# Patient Record
Sex: FEMALE | Race: WHITE | Employment: OTHER | URBAN - METROPOLITAN AREA
[De-identification: names, ages, dates, MRNs, and addresses within clinical notes are randomized per-mention and may not be internally consistent; named-entity substitution may affect disease eponyms.]

---

## 2017-03-03 ENCOUNTER — HOSPITAL ENCOUNTER (OUTPATIENT)
Dept: MRI IMAGING | Age: 67
Discharge: HOME | End: 2017-03-03
Attending: ORTHOPAEDIC SURGERY
Payer: MEDICARE

## 2017-03-03 DIAGNOSIS — M54.16 LUMBAR RADICULOPATHY: ICD-10-CM

## 2017-03-03 PROCEDURE — 72148 MRI LUMBAR SPINE W/O DYE: CPT

## 2017-03-07 PROBLEM — M48.061 SPINAL STENOSIS OF LUMBAR REGION: Status: ACTIVE | Noted: 2017-03-07

## 2017-03-07 PROBLEM — M51.36 DDD (DEGENERATIVE DISC DISEASE), LUMBAR: Status: ACTIVE | Noted: 2017-03-07

## 2017-03-07 PROBLEM — M51.369 DDD (DEGENERATIVE DISC DISEASE), LUMBAR: Status: ACTIVE | Noted: 2017-03-07

## 2017-03-16 PROBLEM — M75.82 ROTATOR CUFF TENDONITIS, LEFT: Status: ACTIVE | Noted: 2017-03-16

## 2017-03-16 PROBLEM — M70.62 TROCHANTERIC BURSITIS OF LEFT HIP: Status: ACTIVE | Noted: 2017-03-16

## 2017-03-23 PROBLEM — M54.16 LUMBAR RADICULITIS: Status: ACTIVE | Noted: 2017-03-23

## 2017-04-28 ENCOUNTER — HOSPITAL ENCOUNTER (OUTPATIENT)
Dept: MAMMOGRAPHY | Age: 67
Discharge: HOME | End: 2017-04-28
Attending: INTERNAL MEDICINE
Payer: MEDICARE

## 2017-04-28 DIAGNOSIS — Z12.39 SCREENING FOR BREAST CANCER: ICD-10-CM

## 2017-04-28 DIAGNOSIS — Z12.31 ENCOUNTER FOR SCREENING MAMMOGRAM FOR MALIGNANT NEOPLASM OF BREAST: ICD-10-CM

## 2017-04-28 PROCEDURE — 77067 SCR MAMMO BI INCL CAD: CPT

## 2017-07-24 PROBLEM — M70.61 TROCHANTERIC BURSITIS, RIGHT HIP: Status: ACTIVE | Noted: 2017-07-24

## 2017-07-24 PROBLEM — S32.592A PUBIC RAMUS FRACTURE, LEFT, CLOSED, INITIAL ENCOUNTER (HCC): Status: ACTIVE | Noted: 2017-07-24

## 2017-11-06 PROCEDURE — 86803 HEPATITIS C AB TEST: CPT | Performed by: INTERNAL MEDICINE

## 2017-11-06 PROCEDURE — 36415 COLL VENOUS BLD VENIPUNCTURE: CPT | Performed by: INTERNAL MEDICINE

## 2018-05-03 ENCOUNTER — HOSPITAL ENCOUNTER (OUTPATIENT)
Dept: MAMMOGRAPHY | Age: 68
Discharge: HOME | End: 2018-05-03
Attending: INTERNAL MEDICINE
Payer: MEDICARE

## 2018-05-03 DIAGNOSIS — Z12.31 ENCOUNTER FOR SCREENING MAMMOGRAM FOR MALIGNANT NEOPLASM OF BREAST: ICD-10-CM

## 2018-05-03 PROCEDURE — 77063 BREAST TOMOSYNTHESIS BI: CPT | Performed by: INTERNAL MEDICINE

## 2018-05-03 PROCEDURE — 77067 SCR MAMMO BI INCL CAD: CPT | Performed by: INTERNAL MEDICINE

## 2018-12-30 PROBLEM — M17.11 PRIMARY OSTEOARTHRITIS OF RIGHT KNEE: Status: ACTIVE | Noted: 2018-12-30

## 2019-03-27 PROBLEM — Z86.010 PERSONAL HISTORY OF COLONIC POLYPS: Status: ACTIVE | Noted: 2019-03-27

## 2019-03-27 PROBLEM — K64.8 OTHER HEMORRHOIDS: Status: ACTIVE | Noted: 2019-03-27

## 2019-03-27 PROBLEM — D12.3 BENIGN NEOPLASM OF TRANSVERSE COLON: Status: ACTIVE | Noted: 2019-03-27

## 2019-03-27 PROBLEM — Z80.0 FAMILY HISTORY OF MALIGNANT NEOPLASM OF DIGESTIVE ORGAN: Status: ACTIVE | Noted: 2019-03-27

## 2019-03-27 PROBLEM — Z86.0100 PERSONAL HISTORY OF COLONIC POLYPS: Status: ACTIVE | Noted: 2019-03-27

## 2019-03-27 PROBLEM — K63.5 POLYP OF COLON: Status: ACTIVE | Noted: 2019-03-27

## 2019-04-04 PROCEDURE — 81003 URINALYSIS AUTO W/O SCOPE: CPT | Performed by: INTERNAL MEDICINE

## 2019-05-22 ENCOUNTER — HOSPITAL ENCOUNTER (OUTPATIENT)
Dept: MAMMOGRAPHY | Age: 69
Discharge: HOME | End: 2019-05-22
Attending: INTERNAL MEDICINE
Payer: MEDICARE

## 2019-05-22 DIAGNOSIS — Z12.31 ENCOUNTER FOR SCREENING MAMMOGRAM FOR MALIGNANT NEOPLASM OF BREAST: ICD-10-CM

## 2019-05-22 PROCEDURE — 77063 BREAST TOMOSYNTHESIS BI: CPT | Performed by: INTERNAL MEDICINE

## 2019-05-22 PROCEDURE — 77067 SCR MAMMO BI INCL CAD: CPT | Performed by: INTERNAL MEDICINE

## 2019-10-14 ENCOUNTER — HOSPITAL ENCOUNTER (OUTPATIENT)
Dept: PHYSICAL THERAPY | Facility: HOSPITAL | Age: 69
Discharge: HOME | End: 2019-10-14
Attending: ORTHOPAEDIC SURGERY
Payer: MEDICARE

## 2019-10-14 ENCOUNTER — APPOINTMENT (OUTPATIENT)
Dept: LAB | Facility: HOSPITAL | Age: 69
End: 2019-10-14
Attending: ORTHOPAEDIC SURGERY
Payer: MEDICARE

## 2019-10-14 DIAGNOSIS — M17.11 PRIMARY OSTEOARTHRITIS OF RIGHT KNEE: ICD-10-CM

## 2019-10-14 DIAGNOSIS — I10 ESSENTIAL HYPERTENSION, BENIGN: ICD-10-CM

## 2019-10-14 DIAGNOSIS — Z79.899 HIGH RISK MEDICATION USE: ICD-10-CM

## 2019-10-14 PROCEDURE — 86850 RBC ANTIBODY SCREEN: CPT

## 2019-10-14 PROCEDURE — 36415 COLL VENOUS BLD VENIPUNCTURE: CPT

## 2019-10-14 PROCEDURE — 80053 COMPREHEN METABOLIC PANEL: CPT

## 2019-10-14 PROCEDURE — 86900 BLOOD TYPING SEROLOGIC ABO: CPT

## 2019-10-14 PROCEDURE — 84443 ASSAY THYROID STIM HORMONE: CPT

## 2019-10-14 PROCEDURE — 85610 PROTHROMBIN TIME: CPT

## 2019-10-14 PROCEDURE — 86901 BLOOD TYPING SEROLOGIC RH(D): CPT

## 2019-10-14 PROCEDURE — 85025 COMPLETE CBC W/AUTO DIFF WBC: CPT

## 2019-10-14 PROCEDURE — 85730 THROMBOPLASTIN TIME PARTIAL: CPT

## 2019-10-17 ENCOUNTER — ANESTHESIA EVENT (OUTPATIENT)
Dept: SURGERY | Facility: HOSPITAL | Age: 69
DRG: 470 | End: 2019-10-17
Payer: MEDICARE

## 2019-10-22 NOTE — H&P
[address] Patient Status:  Surgery Admit - Inpt    1950 MRN [id number]   [facility] SURGERY Attending Anuradha ySlvester MD   Hosp Day # 0 PCP Jerrod Sterling MD     Date of Admission:  (Not on fi N/A 3/9/2017    Performed by Trisha Ragsdale MD at [address]   • LEILA LOCALIZATION WIRE 1 SITE LEFT (CPT=19281)      1997 bn   • LEILA LOCALIZATION WIRE 1 SITE RIGHT (CPT=19281)      1997 bn   • NEEDLE BIOPSY LEFT      bn   • OTHER SURGIC right knee    Impression and Plan:  Patient Active Problem List:     Pure hypercholesterolemia     Essential hypertension, benign     Status post total replacement of both hips     History of colon polyps     Osteopenia     DDD (degenerative disc disease),

## 2019-10-23 ENCOUNTER — HOSPITAL ENCOUNTER (INPATIENT)
Facility: HOSPITAL | Age: 69
LOS: 2 days | Discharge: HOME HEALTH | DRG: 470 | End: 2019-10-25
Attending: ORTHOPAEDIC SURGERY | Admitting: ORTHOPAEDIC SURGERY
Payer: MEDICARE

## 2019-10-23 ENCOUNTER — ANESTHESIA (OUTPATIENT)
Dept: SURGERY | Facility: HOSPITAL | Age: 69
DRG: 470 | End: 2019-10-23
Payer: MEDICARE

## 2019-10-23 DIAGNOSIS — M17.11 PRIMARY OSTEOARTHRITIS OF RIGHT KNEE: Primary | ICD-10-CM

## 2019-10-23 PROCEDURE — 0SRC0J9 REPLACEMENT OF RIGHT KNEE JOINT WITH SYNTHETIC SUBSTITUTE, CEMENTED, OPEN APPROACH: ICD-10-PCS | Performed by: ORTHOPAEDIC SURGERY

## 2019-10-23 PROCEDURE — 3E0T3BZ INTRODUCTION OF ANESTHETIC AGENT INTO PERIPHERAL NERVES AND PLEXI, PERCUTANEOUS APPROACH: ICD-10-PCS | Performed by: ANESTHESIOLOGY

## 2019-10-23 DEVICE — ATTUNE PATELLA MEDIALIZED DOME 32MM CEMENTED AOX
Type: IMPLANTABLE DEVICE | Site: KNEE | Status: FUNCTIONAL
Brand: ATTUNE

## 2019-10-23 DEVICE — ATTUNE KNEE SYSTEM TIBIAL BASE ROTATING PLATFORM SIZE 3 CEMENTED
Type: IMPLANTABLE DEVICE | Site: KNEE | Status: FUNCTIONAL
Brand: ATTUNE

## 2019-10-23 DEVICE — SMARTSET HV HIGH VISCOSITY BONE CEMENT 40G
Type: IMPLANTABLE DEVICE | Site: KNEE | Status: FUNCTIONAL
Brand: SMARTSET

## 2019-10-23 DEVICE — ATTUNE KNEE SYSTEM FEMORAL POSTERIOR STABILIZED SIZE 3 RIGHT CEMENTED
Type: IMPLANTABLE DEVICE | Site: KNEE | Status: FUNCTIONAL
Brand: ATTUNE

## 2019-10-23 DEVICE — ATTUNE KNEE SYSTEM TIBIAL INSERT ROTATING PLATFORM POSTERIOR STABILIZED SIZE 3 6MM AOX
Type: IMPLANTABLE DEVICE | Site: KNEE | Status: FUNCTIONAL
Brand: ATTUNE

## 2019-10-23 RX ORDER — HYDROCODONE BITARTRATE AND ACETAMINOPHEN 5; 325 MG/1; MG/1
1 TABLET ORAL AS NEEDED
Status: DISCONTINUED | OUTPATIENT
Start: 2019-10-23 | End: 2019-10-23 | Stop reason: HOSPADM

## 2019-10-23 RX ORDER — ACETAMINOPHEN 500 MG
1000 TABLET ORAL ONCE AS NEEDED
Status: DISCONTINUED | OUTPATIENT
Start: 2019-10-23 | End: 2019-10-23 | Stop reason: HOSPADM

## 2019-10-23 RX ORDER — MIDAZOLAM HYDROCHLORIDE 1 MG/ML
1 INJECTION INTRAMUSCULAR; INTRAVENOUS EVERY 5 MIN PRN
Status: DISCONTINUED | OUTPATIENT
Start: 2019-10-23 | End: 2019-10-23 | Stop reason: HOSPADM

## 2019-10-23 RX ORDER — PROCHLORPERAZINE EDISYLATE 5 MG/ML
10 INJECTION INTRAMUSCULAR; INTRAVENOUS EVERY 6 HOURS PRN
Status: ACTIVE | OUTPATIENT
Start: 2019-10-23 | End: 2019-10-25

## 2019-10-23 RX ORDER — LOSARTAN POTASSIUM 50 MG/1
50 TABLET ORAL DAILY
Status: DISCONTINUED | OUTPATIENT
Start: 2019-10-24 | End: 2019-10-25

## 2019-10-23 RX ORDER — ASPIRIN 325 MG
325 TABLET ORAL 2 TIMES DAILY
Status: DISCONTINUED | OUTPATIENT
Start: 2019-10-23 | End: 2019-10-25

## 2019-10-23 RX ORDER — DIPHENHYDRAMINE HYDROCHLORIDE 50 MG/ML
25 INJECTION INTRAMUSCULAR; INTRAVENOUS ONCE AS NEEDED
Status: ACTIVE | OUTPATIENT
Start: 2019-10-23 | End: 2019-10-23

## 2019-10-23 RX ORDER — NALOXONE HYDROCHLORIDE 0.4 MG/ML
80 INJECTION, SOLUTION INTRAMUSCULAR; INTRAVENOUS; SUBCUTANEOUS AS NEEDED
Status: DISCONTINUED | OUTPATIENT
Start: 2019-10-23 | End: 2019-10-23 | Stop reason: HOSPADM

## 2019-10-23 RX ORDER — OXYCODONE HYDROCHLORIDE 5 MG/1
5 TABLET ORAL EVERY 4 HOURS PRN
Status: DISPENSED | OUTPATIENT
Start: 2019-10-23 | End: 2019-10-25

## 2019-10-23 RX ORDER — CEFAZOLIN SODIUM/WATER 2 G/20 ML
2 SYRINGE (ML) INTRAVENOUS ONCE
Status: COMPLETED | OUTPATIENT
Start: 2019-10-23 | End: 2019-10-23

## 2019-10-23 RX ORDER — HYDROMORPHONE HYDROCHLORIDE 1 MG/ML
0.2 INJECTION, SOLUTION INTRAMUSCULAR; INTRAVENOUS; SUBCUTANEOUS EVERY 2 HOUR PRN
Status: ACTIVE | OUTPATIENT
Start: 2019-10-23 | End: 2019-10-25

## 2019-10-23 RX ORDER — HYDROMORPHONE HYDROCHLORIDE 1 MG/ML
0.4 INJECTION, SOLUTION INTRAMUSCULAR; INTRAVENOUS; SUBCUTANEOUS EVERY 5 MIN PRN
Status: DISCONTINUED | OUTPATIENT
Start: 2019-10-23 | End: 2019-10-23 | Stop reason: HOSPADM

## 2019-10-23 RX ORDER — EZETIMIBE 10 MG/1
10 TABLET ORAL
Status: ON HOLD | COMMUNITY
End: 2019-10-23

## 2019-10-23 RX ORDER — DOCUSATE SODIUM 100 MG/1
100 CAPSULE, LIQUID FILLED ORAL 2 TIMES DAILY
Status: DISCONTINUED | OUTPATIENT
Start: 2019-10-23 | End: 2019-10-25

## 2019-10-23 RX ORDER — DEXAMETHASONE SODIUM PHOSPHATE 4 MG/ML
4 VIAL (ML) INJECTION AS NEEDED
Status: DISCONTINUED | OUTPATIENT
Start: 2019-10-23 | End: 2019-10-23 | Stop reason: HOSPADM

## 2019-10-23 RX ORDER — ONDANSETRON 2 MG/ML
4 INJECTION INTRAMUSCULAR; INTRAVENOUS AS NEEDED
Status: DISCONTINUED | OUTPATIENT
Start: 2019-10-23 | End: 2019-10-23 | Stop reason: HOSPADM

## 2019-10-23 RX ORDER — ACETAMINOPHEN 500 MG
1000 TABLET ORAL ONCE
COMMUNITY

## 2019-10-23 RX ORDER — OXYCODONE HYDROCHLORIDE 10 MG/1
10 TABLET ORAL EVERY 4 HOURS PRN
Status: ACTIVE | OUTPATIENT
Start: 2019-10-23 | End: 2019-10-25

## 2019-10-23 RX ORDER — ZOLPIDEM TARTRATE 5 MG/1
5 TABLET ORAL NIGHTLY PRN
Status: DISCONTINUED | OUTPATIENT
Start: 2019-10-23 | End: 2019-10-25

## 2019-10-23 RX ORDER — ONDANSETRON 2 MG/ML
4 INJECTION INTRAMUSCULAR; INTRAVENOUS EVERY 4 HOURS PRN
Status: DISPENSED | OUTPATIENT
Start: 2019-10-23 | End: 2019-10-25

## 2019-10-23 RX ORDER — METOCLOPRAMIDE HYDROCHLORIDE 5 MG/ML
10 INJECTION INTRAMUSCULAR; INTRAVENOUS EVERY 6 HOURS PRN
Status: ACTIVE | OUTPATIENT
Start: 2019-10-23 | End: 2019-10-25

## 2019-10-23 RX ORDER — SODIUM PHOSPHATE, DIBASIC AND SODIUM PHOSPHATE, MONOBASIC 7; 19 G/133ML; G/133ML
1 ENEMA RECTAL ONCE AS NEEDED
Status: DISCONTINUED | OUTPATIENT
Start: 2019-10-23 | End: 2019-10-25

## 2019-10-23 RX ORDER — SODIUM CHLORIDE, SODIUM LACTATE, POTASSIUM CHLORIDE, CALCIUM CHLORIDE 600; 310; 30; 20 MG/100ML; MG/100ML; MG/100ML; MG/100ML
INJECTION, SOLUTION INTRAVENOUS CONTINUOUS
Status: DISCONTINUED | OUTPATIENT
Start: 2019-10-23 | End: 2019-10-25

## 2019-10-23 RX ORDER — LABETALOL HYDROCHLORIDE 5 MG/ML
5 INJECTION, SOLUTION INTRAVENOUS EVERY 5 MIN PRN
Status: DISCONTINUED | OUTPATIENT
Start: 2019-10-23 | End: 2019-10-23 | Stop reason: HOSPADM

## 2019-10-23 RX ORDER — ACETAMINOPHEN 500 MG
1000 TABLET ORAL ONCE
Status: DISCONTINUED | OUTPATIENT
Start: 2019-10-23 | End: 2019-10-23 | Stop reason: HOSPADM

## 2019-10-23 RX ORDER — MEPERIDINE HYDROCHLORIDE 25 MG/ML
12.5 INJECTION INTRAMUSCULAR; INTRAVENOUS; SUBCUTANEOUS AS NEEDED
Status: DISCONTINUED | OUTPATIENT
Start: 2019-10-23 | End: 2019-10-23 | Stop reason: HOSPADM

## 2019-10-23 RX ORDER — DIPHENHYDRAMINE HYDROCHLORIDE 50 MG/ML
12.5 INJECTION INTRAMUSCULAR; INTRAVENOUS EVERY 4 HOURS PRN
Status: DISCONTINUED | OUTPATIENT
Start: 2019-10-23 | End: 2019-10-25

## 2019-10-23 RX ORDER — HYDROMORPHONE HYDROCHLORIDE 1 MG/ML
0.3 INJECTION, SOLUTION INTRAMUSCULAR; INTRAVENOUS; SUBCUTANEOUS EVERY 2 HOUR PRN
Status: ACTIVE | OUTPATIENT
Start: 2019-10-23 | End: 2019-10-25

## 2019-10-23 RX ORDER — ACETAMINOPHEN 325 MG/1
TABLET ORAL
Status: COMPLETED
Start: 2019-10-23 | End: 2019-10-23

## 2019-10-23 RX ORDER — CEFAZOLIN SODIUM/WATER 2 G/20 ML
2 SYRINGE (ML) INTRAVENOUS EVERY 8 HOURS
Status: COMPLETED | OUTPATIENT
Start: 2019-10-23 | End: 2019-10-24

## 2019-10-23 RX ORDER — MELATONIN
325
Status: DISCONTINUED | OUTPATIENT
Start: 2019-10-24 | End: 2019-10-25

## 2019-10-23 RX ORDER — KETOROLAC TROMETHAMINE 15 MG/ML
15 INJECTION, SOLUTION INTRAMUSCULAR; INTRAVENOUS EVERY 6 HOURS
Status: COMPLETED | OUTPATIENT
Start: 2019-10-23 | End: 2019-10-24

## 2019-10-23 RX ORDER — POLYETHYLENE GLYCOL 3350 17 G/17G
17 POWDER, FOR SOLUTION ORAL DAILY PRN
Status: DISCONTINUED | OUTPATIENT
Start: 2019-10-23 | End: 2019-10-25

## 2019-10-23 RX ORDER — CELECOXIB 200 MG/1
100 CAPSULE ORAL 2 TIMES DAILY
Status: ON HOLD | COMMUNITY
End: 2019-10-23

## 2019-10-23 RX ORDER — HYDROMORPHONE HYDROCHLORIDE 1 MG/ML
0.4 INJECTION, SOLUTION INTRAMUSCULAR; INTRAVENOUS; SUBCUTANEOUS EVERY 2 HOUR PRN
Status: ACTIVE | OUTPATIENT
Start: 2019-10-23 | End: 2019-10-25

## 2019-10-23 RX ORDER — ACETAMINOPHEN 325 MG/1
650 TABLET ORAL 4 TIMES DAILY
Status: COMPLETED | OUTPATIENT
Start: 2019-10-23 | End: 2019-10-24

## 2019-10-23 RX ORDER — SODIUM CHLORIDE, SODIUM LACTATE, POTASSIUM CHLORIDE, CALCIUM CHLORIDE 600; 310; 30; 20 MG/100ML; MG/100ML; MG/100ML; MG/100ML
INJECTION, SOLUTION INTRAVENOUS CONTINUOUS
Status: DISCONTINUED | OUTPATIENT
Start: 2019-10-23 | End: 2019-10-23 | Stop reason: HOSPADM

## 2019-10-23 RX ORDER — MULTIVITAMIN WITH FOLIC ACID 400 MCG
1 TABLET ORAL DAILY
COMMUNITY

## 2019-10-23 RX ORDER — HYDROCODONE BITARTRATE AND ACETAMINOPHEN 10; 325 MG/1; MG/1
1-2 TABLET ORAL EVERY 4 HOURS PRN
Qty: 60 TABLET | Refills: 0 | Status: SHIPPED | OUTPATIENT
Start: 2019-10-23 | End: 2019-11-07

## 2019-10-23 RX ORDER — DIPHENHYDRAMINE HCL 25 MG
25 CAPSULE ORAL EVERY 4 HOURS PRN
Status: DISCONTINUED | OUTPATIENT
Start: 2019-10-23 | End: 2019-10-25

## 2019-10-23 RX ORDER — LOSARTAN POTASSIUM AND HYDROCHLOROTHIAZIDE 12.5; 1 MG/1; MG/1
0.5 TABLET ORAL DAILY
COMMUNITY
End: 2020-01-08

## 2019-10-23 RX ORDER — BISACODYL 10 MG
10 SUPPOSITORY, RECTAL RECTAL
Status: DISCONTINUED | OUTPATIENT
Start: 2019-10-23 | End: 2019-10-25

## 2019-10-23 RX ORDER — METOCLOPRAMIDE HYDROCHLORIDE 5 MG/ML
10 INJECTION INTRAMUSCULAR; INTRAVENOUS AS NEEDED
Status: DISCONTINUED | OUTPATIENT
Start: 2019-10-23 | End: 2019-10-23 | Stop reason: HOSPADM

## 2019-10-23 RX ORDER — HYDROCODONE BITARTRATE AND ACETAMINOPHEN 5; 325 MG/1; MG/1
2 TABLET ORAL AS NEEDED
Status: DISCONTINUED | OUTPATIENT
Start: 2019-10-23 | End: 2019-10-23 | Stop reason: HOSPADM

## 2019-10-23 RX ORDER — SENNOSIDES 8.6 MG
17.2 TABLET ORAL NIGHTLY
Status: DISCONTINUED | OUTPATIENT
Start: 2019-10-23 | End: 2019-10-25

## 2019-10-23 RX ORDER — TIZANIDINE 2 MG/1
2 TABLET ORAL 3 TIMES DAILY PRN
Status: DISCONTINUED | OUTPATIENT
Start: 2019-10-23 | End: 2019-10-25

## 2019-10-23 RX ORDER — OXYCODONE HYDROCHLORIDE 5 MG/1
2.5 TABLET ORAL EVERY 4 HOURS PRN
Status: DISPENSED | OUTPATIENT
Start: 2019-10-23 | End: 2019-10-25

## 2019-10-23 NOTE — INTERVAL H&P NOTE
Pre-op Diagnosis: Primary osteoarthritis of right knee [M17.11]    The above referenced H&P was reviewed by Eric Ring MD on 10/23/2019, the patient was examined and no significant changes have occurred in the patient's condition since the H&P was pe

## 2019-10-23 NOTE — PLAN OF CARE
NURSING ADMISSION NOTE      Patient admitted via bed   Oriented to room. Safety precautions initiated. Bed in low position. Call light in reach. Dr. Casa Torres aware of admission and orders rec'd. Pt numb when rec'd from PACU.  Pt was able to void on

## 2019-10-23 NOTE — OPERATIVE REPORT
DATE OF PROCEDURE:  10/23/2019  PREOPERATIVE DIAGNOSIS: Right knee osteoarthritis. POSTOPERATIVE DIAGNOSIS: Right knee osteoarthritis. PROCEDURE PERFORMED: Cemented right total knee arthroplasty. SURGEON:  Pedro Pablo Kern M.D.   FIRST ASSISTANT: Tristian Jiménez and extension, good tracking of the patella. Distal femoral condyle drill holes were made using the trial template. Final components the same size as the trials were utilized. Trial components were removed. Bony surfaces were irrigated and dried.  Final

## 2019-10-23 NOTE — CONSULTS
[facility] hospitalist initial consult note  Alisha Alvarez MD  Consulted at the request of Dr. Judah Cannon  Reason for consult medical co-management    HPI 78 yo female with multiple medical problems inluding but not limited to HTN, HL, OA here s/p S/p R TKA  P Mel David MD at [address]   • LEILA LOCALIZATION WIRE 1 SITE LEFT (CPT=19281)      1997 bn   • LEILA LOCALIZATION WIRE 1 SITE RIGHT (CPT=19281)      1997 bn   • NEEDLE BIOPSY LEFT      bn   • OTHER SURGICAL HISTORY      Bilateral eye service: Not on file        Active member of club or organization: Not on file        Attends meetings of clubs or organizations: Not on file        Relationship status: Not on file      Intimate partner violence:        Fear of current or ex partner: Not 3  [DISCONTINUED] LOSARTAN POTASSIUM-HCTZ 100-12.5 MG Oral Tab, TAKE 1/2 TABLET EVERY DAY, Disp: 45 tablet, Rfl: 2      ROS 10 systems reviewed and negative except as in HPI  PE    10/23/19  1348   BP: 137/79   Pulse: 80   Resp: 12   Temp:    Gen: awake, a

## 2019-10-23 NOTE — PHYSICAL THERAPY NOTE
Order received for PT eval. Attempted to see Pt this afternoon, however, Pt demonstrates lack of sensation/movement of R LE. Pt not appropriate for skilled therapy. Will follow up 10/24/19.

## 2019-10-23 NOTE — ANESTHESIA POSTPROCEDURE EVALUATION
[address] [facility] Patient Status:  Surgery Admit - Inpt   Age/Gender 77year old female MRN [id number]   Location [address] [facility] Attending Jj Duckworth MD   Hosp Day # 0 PCP Javi Peña MD       Anesthesia Pos

## 2019-10-24 RX ORDER — HYDRALAZINE HYDROCHLORIDE 20 MG/ML
10 INJECTION INTRAMUSCULAR; INTRAVENOUS EVERY 6 HOURS PRN
Status: DISCONTINUED | OUTPATIENT
Start: 2019-10-24 | End: 2019-10-25

## 2019-10-24 RX ORDER — HYDROCODONE BITARTRATE AND ACETAMINOPHEN 10; 325 MG/1; MG/1
1 TABLET ORAL EVERY 4 HOURS PRN
Status: DISCONTINUED | OUTPATIENT
Start: 2019-10-25 | End: 2019-10-25

## 2019-10-24 RX ORDER — HYDROCODONE BITARTRATE AND ACETAMINOPHEN 10; 325 MG/1; MG/1
2 TABLET ORAL EVERY 4 HOURS PRN
Status: DISCONTINUED | OUTPATIENT
Start: 2019-10-25 | End: 2019-10-25

## 2019-10-24 NOTE — PROGRESS NOTES
81 [facility] Patient Status:  Inpatient    1950 MRN [id number]   [facility] 3SW-A Attending Jose Antonio Sharif MD   [address] Day # 1 PCP Ben Whiltock MD     Subjective:  S/P RIGHT Total Knee Arthroplasty  Systemic or Spe

## 2019-10-24 NOTE — CM/SW NOTE
76 yo sp total knee replacement. Post op protocol order for CM/SHARI. Preop Joint journey plan from [address] [place] for pt to discharge home with [facility] home health. HOME SITUATION  Type of Home: House   Home Layout: Two level; Able to live on main level

## 2019-10-24 NOTE — PHYSICAL THERAPY NOTE
PHYSICAL THERAPY KNEE TREATMENT NOTE - INPATIENT     Room Number: 807/355-P     Session: 1&2   Number of Visits to Meet Established Goals: 4    Presenting Problem: s/p R TKA 10/23/19    Problem List  Active Problems:    * No active hospital problems.  * SURGICAL HISTORY      Bilateral eyelids   • OTHER SURGICAL HISTORY      multiple breast biopsy   • SPINE SURGERY PROCEDURE UNLISTED     • TOTAL HIP REPLACEMENT Left 10/2015   • TOTAL HIP REPLACEMENT Right 12/03/2015   • UPPER GI ENDOSCOPY,EXAM  1/2013    c and standing therex per TKA protocol. Pt required min A for set up and cues for technique and body mechanics.   Pt was gait trained 225 feet c RW and CGA to supervision assist. Pt cued for heel/toe pattern, reciprocal gait pattern and WBAT with good return PT    PLAN  PT Treatment Plan: Bed mobility; Endurance; Energy conservation;Patient education;Gait training;Neuromuscular re-educate;Range of motion;Strengthening;Stoop training;Stair training;Transfer training;Balance training  Rehab Potential : Abby Valadez

## 2019-10-24 NOTE — OCCUPATIONAL THERAPY NOTE
OCCUPATIONAL THERAPY EVALUATION - INPATIENT     Room Number: 379/379-A  Evaluation Date: 10/24/2019  Type of Evaluation: Initial  Presenting Problem: s/p R TKA on 10/23     Physician Order: IP Consult to Occupational Therapy  Reason for Therapy: ADL/IADL D Performed by Belen Whiting MD at [address] [facility]   • LUMBAR EPIDURAL N/A 4/7/2017    Performed by Katharine Marsh MD at [address] [facility] N/A 3/23/2017    Performed by Katharine Marsh MD at [facility]   • L MOTION AND STRENGTH ASSESSMENT  Upper extremity ROM is within functional limits     Upper extremity strength is within functional limits     COORDINATION  Gross Motor    WFL   Fine Motor    WFL      ADDITIONAL TESTS                                    NEURO condition. Patient End of Session: Up in chair;Needs met;Call light within reach;RN aware of session/findings; All patient questions and concerns addressed;SCDs in place; Ice applied    ASSESSMENT     Patient is a 77year old female admitted on 10/23/2019 evaluation  Rehab Potential : Good  Frequency (Obs): Daily  Number of Visits to Meet Established Goals: 1    ADL Goals   Patient will perform lower body dressing:  with supervision  Patient will perform toileting: with supervision    Functional Transfer Go

## 2019-10-24 NOTE — PROGRESS NOTES
[facility] Hospitalist Progress Note     Landon Cabrera Patient Status:  Inpatient    1950 MRN [id number]   [facility] 3SW-A Attending Lanny Amin MD   Hosp Day # 1 PCP Jatinder Morin MD     CC: follow up    SUBJECTIVE:  Better today  Pa **OR** diphenhydrAMINE HCl, Zolpidem Tartrate, oxyCODONE HCl **OR** oxyCODONE HCl **OR** oxyCODONE HCl, HYDROmorphone HCl **OR** HYDROmorphone HCl **OR** HYDROmorphone HCl, tiZANidine HCl        Assessment/Plan:     OA s/p R TKA  Post-op mgmt per ortho  HI

## 2019-10-24 NOTE — HOME CARE LIAISON
MET WITH PTNT AND OFFERED CHOICE  OF AGENCIES. PTNT AGREEABLE TO [facility]. MET WITH PTNT TO DISCUSS HOME HEALTH SERVICES AND COVERAGE CRITERIA. PTNT AGREEABLE TO [facility]. PTNT GIVEN RESIDENTIAL BROCHURE.  RESIDENTIAL WITH PROVIDE SN/PT ON DISC

## 2019-10-24 NOTE — PLAN OF CARE
Pain well controlled with scheduled oral and IV medication. Sensation to rt leg returning, able to dorsi/plantar flex ankles with mod strength. Can lift leg off bed slightly. VSS, spo2 wnl on RA. PT/OT, d/c plan tbd.

## 2019-10-24 NOTE — PHYSICAL THERAPY NOTE
PHYSICAL THERAPY KNEE EVALUATION - INPATIENT     Room Number: 379/379-A  Evaluation Date: 10/24/2019  Type of Evaluation: Initial  Physician Order: PT Eval and Treat    Presenting Problem: s/p R TKA 10/23/19  Reason for Therapy: Mobility Dysfunction and Merlin Jennings N/A 3/9/2017    Performed by Katharine Marsh MD at [address]   • LEILA LOCALIZATION WIRE 1 SITE LEFT (CPT=19281)      1997 bn   • LEILA LOCALIZATION WIRE 1 SITE RIGHT (CPT=19281)      1997 bn   • NEEDLE BIOPSY LEFT      bn   • OTHER SURGIC ADDITIONAL TESTS                                 ACTIVITY TOLERANCE                         O2 WALK                  AM-PAC '6-Clicks' INPATIENT SHORT FORM - BASIC MOBILITY  How much difficulty does the patient currently hav pumps  Quad sets  Transfer training    Patient End of Session: Up in chair;Needs met;Call light within reach;RN aware of session/findings; All patient questions and concerns addressed;SCDs in place; Ice applied    ASSESSMENT   Patient is a 77year old female supervision    Goal #4    Patient will negotiate 4 stairs/one curb w/ assistive device and supervision    Goal #5    AROM 0 degrees extension to 95 degrees flexion      Goal #6        Goal Comments: Goals established on 10/24/2019

## 2019-10-24 NOTE — PLAN OF CARE
Discussed Plan of Care with patient including call don't fall, pain management, and PT/OT times. Instructed to notify RN of increased pain or discomfort. States pain is tolerable at this time while sitting in the chair. Discussed pain goal of 4 or less.

## 2019-10-25 VITALS
HEART RATE: 103 BPM | DIASTOLIC BLOOD PRESSURE: 68 MMHG | HEIGHT: 58 IN | OXYGEN SATURATION: 98 % | TEMPERATURE: 99 F | WEIGHT: 131.94 LBS | BODY MASS INDEX: 27.7 KG/M2 | SYSTOLIC BLOOD PRESSURE: 168 MMHG | RESPIRATION RATE: 20 BRPM

## 2019-10-25 PROCEDURE — 99291 CRITICAL CARE FIRST HOUR: CPT | Performed by: INTERNAL MEDICINE

## 2019-10-25 RX ORDER — SODIUM CHLORIDE 9 MG/ML
INJECTION, SOLUTION INTRAVENOUS CONTINUOUS
Status: DISCONTINUED | OUTPATIENT
Start: 2019-10-25 | End: 2019-10-25

## 2019-10-25 RX ORDER — SODIUM CHLORIDE 9 MG/ML
INJECTION, SOLUTION INTRAVENOUS ONCE
Status: COMPLETED | OUTPATIENT
Start: 2019-10-25 | End: 2019-10-25

## 2019-10-25 RX ORDER — DOCUSATE SODIUM 100 MG/1
100 CAPSULE, LIQUID FILLED ORAL 2 TIMES DAILY
Qty: 30 CAPSULE | Refills: 0 | Status: SHIPPED | OUTPATIENT
Start: 2019-10-25 | End: 2019-11-11 | Stop reason: ALTCHOICE

## 2019-10-25 RX ORDER — MELATONIN
325
Qty: 14 TABLET | Refills: 0 | Status: SHIPPED | OUTPATIENT
Start: 2019-10-26 | End: 2019-10-31 | Stop reason: ALTCHOICE

## 2019-10-25 RX ORDER — ACETAMINOPHEN 325 MG/1
650 TABLET ORAL EVERY 6 HOURS PRN
Status: DISCONTINUED | OUTPATIENT
Start: 2019-10-25 | End: 2019-10-25

## 2019-10-25 NOTE — PLAN OF CARE
Patient with some lightheadedness while ambulating with PT this am.  BP med held. Will discuss with MD on rounds.

## 2019-10-25 NOTE — PLAN OF CARE
Patient walked to the bathroom with min assist with walker ,felt slightly nauseated ,while sitting in the toilet she almost passed out ,called RRT ,assisted her to chair and brought in to the room ,blood pressure 102/58 ,was 134/68 prior to getting up to b

## 2019-10-25 NOTE — PLAN OF CARE
Patient alert and oriented ,resting in bed ,states she is not feeling well ,feels light headed and upset to her stomach sweaty ,feels cold ,blood pressure 138/69 ,heart rate in the 70's ,o2 sat 95% on room air,denies any chest pain or short of breath ,emma

## 2019-10-25 NOTE — PROGRESS NOTES
Post Op Day 2 Ortho Note: Right TKA    Assessed patient in chair. Rates pain 2/10 at rest and 3-4/10 with activity. States pain is managed with medications; denies itching/nausea/dizziness. Able to bear weight on sx leg; equal sensation in BLE.      No f

## 2019-10-25 NOTE — PLAN OF CARE
Patient has been alert and oriented x4, pain has been well controlled with Po pain medication. Started on 1/2 of Norco and tolerated it well. BP remains stable , denies any nausea and any dizziness, She tolerated PT session well.  Stated she is ready to go

## 2019-10-25 NOTE — PHYSICAL THERAPY NOTE
PHYSICAL THERAPY KNEE TREATMENT NOTE - INPATIENT     Room Number: 488/221-C     Session: 3  Number of Visits to Meet Established Goals: 4    Presenting Problem: s/p R TKA 10/23/19    Problem List  Active Problems:    Pre-syncope      Past Medical History RIGHT (CPT=19281)      1997 bn   • NEEDLE BIOPSY LEFT      bn   • OTHER SURGICAL HISTORY      Bilateral eyelids   • OTHER SURGICAL HISTORY      multiple breast biopsy   • SPINE SURGERY PROCEDURE UNLISTED     • TOTAL HIP REPLACEMENT Left 10/2015   • TOTAL H wheeled to rehab gym and participated in seated and standing therex per TKA protocol. Pt requires CGA to min A to  AAROM for SAQ 2/2 to pain. Pt unable to complete full set of SLR 2/2 pain and dizziness.    Pt gait trained 300' c RW and CGA progressing to s training;Neuromuscular re-educate;Range of motion;Strengthening;Stoop training;Stair training;Transfer training;Balance training  Rehab Potential : Good  Frequency (Obs): BID    CURRENT GOALS   Goal #1     Patient is able to demonstrate supine - sit EOB @

## 2019-10-25 NOTE — PROGRESS NOTES
81 [facility] Patient Status:  Inpatient    1950 MRN [id number]   [facility] 3SW-A Attending Debbie Gutierrez MD   [facility] Day # 2 PCP Akbar Quijano MD     Subjective:  S/P RIGHT Total Knee Arthroplasty  Systemic or Spe

## 2019-10-25 NOTE — PLAN OF CARE
Spoke with Dr. Geri Peck regarding patient's last two BP readings. 156/72, 142/71. Will continue to monitor. PRN hydralazine order to be placed by Dr. Geri Peck. Will notify oncoming RN.

## 2019-10-25 NOTE — OCCUPATIONAL THERAPY NOTE
OCCUPATIONAL THERAPY TREATMENT NOTE - INPATIENT     Room Number: 853/982-Z  Session: 1   Number of Visits to Meet Established Goals: 2    Presenting Problem: s/p R TKA on 10/23     History related to current admission: Pt is 77year old female admitted on [facility] MAIN OR   • LUMBAR EPIDURAL N/A 4/7/2017    Performed by Ishan Carvalho MD at [address] [place] [address] N/A 3/23/2017    Performed by Ishan Carvalho MD at [address] [place] [address] N/A 3/9/2017    Perf 42.03  CMS Modifier (G-Code): CJ    FUNCTIONAL TRANSFER ASSESSMENT  Supine to Sit : Not tested  Sit to Stand: Contact guard assist    Skilled Therapy Provided: Pt was found sitting up in a chair. Pt was educated on safety.  Pt performed a sit><stand with RW to return safely to her prior level of function. OT Discharge Recommendations: Home with home health PT/OT; Intermittent Supervision  OT Device Recommendations: TBD    PLAN  OT Treatment Plan: Balance activities; ADL training;Energy conservation/work si

## 2019-10-25 NOTE — PROGRESS NOTES
[facility] Hospitalist Progress Note     Nelly Valencia Patient Status:  Inpatient    1950 MRN [id number]   [facility] 3SW-A Attending Jose Antonio Sharif MD   Hosp Day # 2 PCP Ben Whitlock MD     CC: follow up    SUBJECTIVE:  Had near-syncopa hydrALAzine HCl, PEG 3350, magnesium hydroxide, bisacodyl, FLEET ENEMA, ondansetron HCl, Metoclopramide HCl, Prochlorperazine Edisylate, diphenhydrAMINE **OR** diphenhydrAMINE HCl, Zolpidem Tartrate, HYDROmorphone HCl **OR** HYDROmorphone HCl **OR** HYDROm

## 2019-10-25 NOTE — PROGRESS NOTES
NURSING DISCHARGE NOTE    Discharged Home via Wheelchair. Accompanied by Spouse  Belongings Taken by patient/family. All scripts were filled by the OP.

## 2019-10-25 NOTE — PROGRESS NOTES
[facility]  RAPID RESPONSE NOTE     Em Krishna Patient Status:  Inpatient    1950 MRN [id number]   [facility] 3SW-A Attending Mandeep Muñoz MD   Hosp Day # 2 PCP Jocelyn Medina MD     Reason for RRT: Pre-Syncope    Patient

## 2019-10-25 NOTE — PLAN OF CARE
Patient feels much better ,denies need for pain meds ,no more nausea ,vitals stable ,will continue to monitor .

## 2019-10-25 NOTE — CM/SW NOTE
10/25/19 1500   Discharge disposition   Expected discharge disposition Home-Health   Name of [facility] 1 services after discharge Skilled home care   Discharge transportation Private car

## 2019-10-28 NOTE — DISCHARGE SUMMARY
[facility]  Discharge Summary    Kg Elder Patient Status:  Inpatient    1950 MRN [id number]   [facility] 3SW-A Attending No att. providers found   Hosp Day # 2 PCP Yuly White MD     Date of Admission: 10/23/2019 Disposition as:   TYLENOL EXTRA STRENGTH     amoxicillin 500 MG Caps  Commonly known as:  AMOXIL  TAKE FOUR CAPSULES BY MOUTH ONE HOUR BEFORE APPOINTMENT AS NEEDED     BIOTIN OR     Losartan Potassium-HCTZ 100-12.5 MG Tabs  Commonly known as:  HYZAAR     OSTEO BI-FLEX

## 2019-12-03 ENCOUNTER — HOSPITAL ENCOUNTER (OUTPATIENT)
Dept: BONE DENSITY | Age: 69
Discharge: HOME | End: 2019-12-03
Attending: INTERNAL MEDICINE
Payer: MEDICARE

## 2019-12-03 DIAGNOSIS — Z78.0 ASYMPTOMATIC MENOPAUSAL STATE: ICD-10-CM

## 2019-12-03 DIAGNOSIS — Z13.820 SCREENING FOR OSTEOPOROSIS: ICD-10-CM

## 2019-12-03 PROCEDURE — 77080 DXA BONE DENSITY AXIAL: CPT | Performed by: INTERNAL MEDICINE

## 2020-06-09 ENCOUNTER — HOSPITAL ENCOUNTER (OUTPATIENT)
Dept: MAMMOGRAPHY | Age: 70
Discharge: HOME | End: 2020-06-09
Attending: INTERNAL MEDICINE
Payer: MEDICARE

## 2020-06-09 DIAGNOSIS — Z12.31 ENCOUNTER FOR SCREENING MAMMOGRAM FOR MALIGNANT NEOPLASM OF BREAST: ICD-10-CM

## 2020-06-09 DIAGNOSIS — Z12.39 SCREENING FOR BREAST CANCER: ICD-10-CM

## 2020-06-09 PROCEDURE — 77063 BREAST TOMOSYNTHESIS BI: CPT | Performed by: INTERNAL MEDICINE

## 2020-06-09 PROCEDURE — 77067 SCR MAMMO BI INCL CAD: CPT | Performed by: INTERNAL MEDICINE

## 2020-08-03 PROBLEM — G89.29 CHRONIC KNEE PAIN AFTER TOTAL REPLACEMENT OF RIGHT KNEE JOINT: Status: ACTIVE | Noted: 2020-08-03

## 2020-08-03 PROBLEM — Z96.651 CHRONIC KNEE PAIN AFTER TOTAL REPLACEMENT OF RIGHT KNEE JOINT: Status: ACTIVE | Noted: 2020-08-03

## 2020-08-03 PROBLEM — M25.561 CHRONIC KNEE PAIN AFTER TOTAL REPLACEMENT OF RIGHT KNEE JOINT: Status: ACTIVE | Noted: 2020-08-03

## 2020-11-18 ENCOUNTER — OFFICE VISIT (OUTPATIENT)
Dept: FAMILY MEDICINE CLINIC | Facility: CLINIC | Age: 70
End: 2020-11-18
Payer: MEDICARE

## 2020-11-18 VITALS
HEART RATE: 85 BPM | SYSTOLIC BLOOD PRESSURE: 160 MMHG | DIASTOLIC BLOOD PRESSURE: 80 MMHG | WEIGHT: 132.38 LBS | HEIGHT: 56.69 IN | BODY MASS INDEX: 28.96 KG/M2

## 2020-11-18 DIAGNOSIS — R73.9 HYPERGLYCEMIA: ICD-10-CM

## 2020-11-18 DIAGNOSIS — F33.42 RECURRENT MAJOR DEPRESSIVE DISORDER, IN FULL REMISSION (HCC): ICD-10-CM

## 2020-11-18 DIAGNOSIS — Z12.31 VISIT FOR SCREENING MAMMOGRAM: ICD-10-CM

## 2020-11-18 DIAGNOSIS — Z00.00 ENCOUNTER FOR ANNUAL HEALTH EXAMINATION: ICD-10-CM

## 2020-11-18 DIAGNOSIS — Z00.00 ANNUAL PHYSICAL EXAM: Primary | ICD-10-CM

## 2020-11-18 DIAGNOSIS — M85.832 OSTEOPENIA OF BOTH FOREARMS: ICD-10-CM

## 2020-11-18 DIAGNOSIS — E78.00 PURE HYPERCHOLESTEROLEMIA: ICD-10-CM

## 2020-11-18 DIAGNOSIS — N39.0 RECURRENT UTI: ICD-10-CM

## 2020-11-18 DIAGNOSIS — M85.831 OSTEOPENIA OF BOTH FOREARMS: ICD-10-CM

## 2020-11-18 DIAGNOSIS — I10 ESSENTIAL HYPERTENSION: ICD-10-CM

## 2020-11-18 DIAGNOSIS — Z00.00 LABORATORY EXAMINATION ORDERED AS PART OF A ROUTINE GENERAL MEDICAL EXAMINATION: ICD-10-CM

## 2020-11-18 PROBLEM — F33.0 MILD RECURRENT MAJOR DEPRESSION: Status: ACTIVE | Noted: 2020-11-18

## 2020-11-18 PROBLEM — F33.0 MILD RECURRENT MAJOR DEPRESSION (HCC): Status: ACTIVE | Noted: 2020-11-18

## 2020-11-18 PROCEDURE — 99203 OFFICE O/P NEW LOW 30 MIN: CPT | Performed by: FAMILY MEDICINE

## 2020-11-18 PROCEDURE — G0439 PPPS, SUBSEQ VISIT: HCPCS | Performed by: FAMILY MEDICINE

## 2020-11-18 RX ORDER — CEPHALEXIN 500 MG/1
500 CAPSULE ORAL 3 TIMES DAILY
Qty: 21 CAPSULE | Refills: 0 | Status: SHIPPED | OUTPATIENT
Start: 2020-11-18 | End: 2020-11-25

## 2020-11-18 RX ORDER — LOSARTAN POTASSIUM AND HYDROCHLOROTHIAZIDE 12.5; 1 MG/1; MG/1
0.5 TABLET ORAL DAILY
Qty: 45 TABLET | Refills: 3 | Status: SHIPPED | OUTPATIENT
Start: 2020-11-18 | End: 2021-10-08

## 2020-11-18 NOTE — PROGRESS NOTES
HPI:   Dexter Gamboa is a 68year old female who presents for a Medicare Subsequent Annual Wellness visit (Pt already had Initial Annual Wellness). New patient, former Dr Shen Mcmahon at [facility].    Her last annual assessment has been over 1 year: Annual Physical DDD (degenerative disc disease), lumbar     Spinal stenosis of lumbar region     Lumbar radiculitis     Family history of malignant neoplasm of digestive organ     Chronic knee pain after total replacement of right knee joint     Recurrent major depressive benign biopsy right; colonoscopy; back surgery (2/5/15); hip replacement surgery; spine surgery procedure unlisted; total hip replacement (Left, 10/2015); total hip replacement (Right, 12/03/2015); and knee replacement surgery.     Her family history includ Acuity: 20/30   Able To Tolerate Visual Acuity: Yes      Physical Exam   Nursing note and vitals reviewed. Constitutional: She is oriented to person, place, and time. Vital signs are normal. She appears well-developed and well-nourished.    HENT:   Head: thought content normal. Cognition and memory are normal.        Vaccination History     Immunization History   Administered Date(s) Administered   • Depo-Medrol 40mg Inj 01/15/2016, 03/15/2016, 05/05/2016, 05/05/2016, 06/20/2016, 07/26/2016, 07/26/2016, 09 management.                Relevant Orders    OFFICE/OUTPT VISIT,NEW,LEVL III    COMP METABOLIC PANEL (14)    LIPID PANEL       Mental Health    Recurrent major depressive disorder, in full [facility] ([facility]    Overview     esitalopram in 2019 after several s review of chart, separate sheet to patient  [address] Internal Lab or Procedure External Lab or Procedure   Diabetes Screening      HbgA1C   Annually HbA1c (%)   Date Value   10/24/2017 5.5    No flowsheet data found. get once after your 65th birthday    Pneumococcal 23 (Pneumovax)  Covered Once after 65 10/01/2014 Please get once after your 65th birthday    Hepatitis B for Moderate/High Risk No vaccine history found Medium/high risk factors:   End-stage renal disease

## 2020-11-18 NOTE — PATIENT INSTRUCTIONS
Lori Garcia's SCREENING SCHEDULE   Tests on this list are recommended by your physician but may not be covered, or covered at this frequency, by your insurer. Please check with your insurance carrier before scheduling to verify coverage.    PREVENTATIVE S more than 100 cigarettes in their lifetime   • Anyone with a family history    Colorectal Cancer Screening  Covered up to Age 76     Colonoscopy Screen   Covered every 10 years- more often if abnormal Colonoscopy due on 03/27/2024 Update [facility] ANTIG   Orders placed or performed in visit on 11/12/13   • INFLUENZA VIRUS VACCINE, >=1YEARS OF AGE   Orders placed or performed in visit on 11/09/12   • INFLUENZA VIRUS VACCINE, >=1YEARS OF AGE   Orders placed or performed in visit on 10/13/11   • INFL including necessary form from the [facility]. http://[email or web address] [email or web address]. [place]/public/books/advin.htm  A link to the [employer].  This site has a lot of good information including definitions of the different t

## 2020-11-20 ENCOUNTER — LAB ENCOUNTER (OUTPATIENT)
Dept: LAB | Age: 70
End: 2020-11-20
Attending: FAMILY MEDICINE
Payer: MEDICARE

## 2020-11-20 DIAGNOSIS — R73.9 HYPERGLYCEMIA: ICD-10-CM

## 2020-11-20 DIAGNOSIS — E78.00 PURE HYPERCHOLESTEROLEMIA: ICD-10-CM

## 2020-11-20 DIAGNOSIS — Z00.00 LABORATORY EXAMINATION ORDERED AS PART OF A ROUTINE GENERAL MEDICAL EXAMINATION: ICD-10-CM

## 2020-11-20 DIAGNOSIS — N39.0 RECURRENT UTI: ICD-10-CM

## 2020-11-20 DIAGNOSIS — F33.42 RECURRENT MAJOR DEPRESSIVE DISORDER, IN FULL REMISSION (HCC): ICD-10-CM

## 2020-11-20 PROCEDURE — 85027 COMPLETE CBC AUTOMATED: CPT

## 2020-11-20 PROCEDURE — 36415 COLL VENOUS BLD VENIPUNCTURE: CPT

## 2020-11-20 PROCEDURE — 84443 ASSAY THYROID STIM HORMONE: CPT

## 2020-11-20 PROCEDURE — 80061 LIPID PANEL: CPT

## 2020-11-20 PROCEDURE — 80053 COMPREHEN METABOLIC PANEL: CPT

## 2020-11-20 PROCEDURE — 83036 HEMOGLOBIN GLYCOSYLATED A1C: CPT

## 2021-02-24 RX ORDER — CEPHALEXIN 500 MG/1
500 CAPSULE ORAL 3 TIMES DAILY
Qty: 21 CAPSULE | Refills: 0 | Status: SHIPPED | OUTPATIENT
Start: 2021-02-24 | End: 2021-11-22

## 2021-02-24 NOTE — TELEPHONE ENCOUNTER
Pt is calling for her refill for her Cephalexin 500 mg pleas send to [facility] [address] . With her traveling Dr. Jovany Pérez fills it for her so she has it on hand.

## 2021-03-13 DIAGNOSIS — Z23 NEED FOR VACCINATION: ICD-10-CM

## 2021-03-30 ENCOUNTER — TELEPHONE (OUTPATIENT)
Dept: FAMILY MEDICINE CLINIC | Facility: CLINIC | Age: 71
End: 2021-03-30

## 2021-03-30 RX ORDER — AMOXICILLIN 500 MG/1
CAPSULE ORAL
Qty: 4 CAPSULE | Refills: 0 | Status: CANCELLED | OUTPATIENT
Start: 2021-03-30

## 2021-03-30 NOTE — TELEPHONE ENCOUNTER
Spoke to pt and Amoxicillin is needed prior to dental cleaning. She does not need Rx now as pharmacy had it filled under previous PCP, Dr Felipe Mack. She will need it in the fall for dental cleaning.

## 2021-03-30 NOTE — TELEPHONE ENCOUNTER
[facility] a [facility] Amox[facility]. Left message on answering machine to call triage. Is this being requested for pre-dental appt?

## 2021-06-11 ENCOUNTER — HOSPITAL ENCOUNTER (OUTPATIENT)
Dept: MAMMOGRAPHY | Age: 71
Discharge: HOME | End: 2021-06-11
Attending: FAMILY MEDICINE
Payer: MEDICARE

## 2021-06-11 DIAGNOSIS — Z12.31 VISIT FOR SCREENING MAMMOGRAM: ICD-10-CM

## 2021-06-11 PROCEDURE — 77067 SCR MAMMO BI INCL CAD: CPT | Performed by: FAMILY MEDICINE

## 2021-06-11 PROCEDURE — 77063 BREAST TOMOSYNTHESIS BI: CPT | Performed by: FAMILY MEDICINE

## 2021-06-23 ENCOUNTER — OFFICE VISIT (OUTPATIENT)
Dept: FAMILY MEDICINE CLINIC | Facility: CLINIC | Age: 71
End: 2021-06-23
Payer: MEDICARE

## 2021-06-23 VITALS
BODY MASS INDEX: 30.73 KG/M2 | TEMPERATURE: 98 F | SYSTOLIC BLOOD PRESSURE: 124 MMHG | HEART RATE: 72 BPM | DIASTOLIC BLOOD PRESSURE: 72 MMHG | RESPIRATION RATE: 12 BRPM | WEIGHT: 136.63 LBS | HEIGHT: 56 IN

## 2021-06-23 DIAGNOSIS — R60.0 LEG EDEMA, LEFT: ICD-10-CM

## 2021-06-23 DIAGNOSIS — M85.832 OSTEOPENIA OF BOTH FOREARMS: ICD-10-CM

## 2021-06-23 DIAGNOSIS — F33.42 RECURRENT MAJOR DEPRESSIVE DISORDER, IN FULL REMISSION (HCC): ICD-10-CM

## 2021-06-23 DIAGNOSIS — M85.831 OSTEOPENIA OF BOTH FOREARMS: ICD-10-CM
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
## 2021-06-23 PROCEDURE — 99214 OFFICE O/P EST MOD 30 MIN: CPT | Performed by: FAMILY MEDICINE

## 2021-06-23 RX ORDER — BETAMETHASONE DIPROPIONATE 0.5 MG/G
LOTION TOPICAL
Qty: 60 ML | Refills: 2 | Status: SHIPPED | OUTPATIENT
Start: 2021-06-23

## 2021-06-23 NOTE — PROGRESS NOTES
Alexia Zhang is a 65year old female coming in for had concerns including Fall (was in [address] visit their son, fell in the shower, had a bad hard fall ) and Ankle Pain (started 3 weeks ago, left ankle more swollen than the right ).     HPI/Subjective:   HPI fel Thought content normal.         Judgment: Judgment normal.           Assessment & Plan:   1. Fall, initial encounter (Primary)  2.  Recurrent major depressive disorder, in full remission ([facility]  Overview:  esitalopram in 2019 after several surgeries, complet

## 2021-06-25 ENCOUNTER — HOSPITAL ENCOUNTER (OUTPATIENT)
Dept: ULTRASOUND IMAGING | Age: 71
Discharge: HOME | End: 2021-06-25
Attending: FAMILY MEDICINE
Payer: MEDICARE

## 2021-06-25 DIAGNOSIS — R60.0 LEG EDEMA, LEFT: ICD-10-CM

## 2021-06-25 PROCEDURE — 93971 EXTREMITY STUDY: CPT | Performed by: FAMILY MEDICINE

## 2021-09-17 ENCOUNTER — TELEPHONE (OUTPATIENT)
Dept: FAMILY MEDICINE CLINIC | Facility: CLINIC | Age: 71
End: 2021-09-17

## 2021-09-17 DIAGNOSIS — Z00.00 LABORATORY EXAMINATION ORDERED AS PART OF A ROUTINE GENERAL MEDICAL EXAMINATION: Primary | ICD-10-CM

## 2021-09-17 NOTE — TELEPHONE ENCOUNTER
Please enter lab orders for the patient's upcoming physical appointment. Physical scheduled:    Your appointments     Date & Time Appointment Department [facility])    Nov 22, 2021  1:15 PM CST Medicare Annual Well Visit with Yasmine Webb MD [facility]

## 2021-09-23 NOTE — TELEPHONE ENCOUNTER
Pt is calling she is having a dental procedure on Tuesday and needs an antibiotic Amoxicillin 500 mg 4 tablets. Please send to [facility] on [address].

## 2021-09-24 RX ORDER — AMOXICILLIN 500 MG/1
CAPSULE ORAL
Qty: 4 CAPSULE | Refills: 3 | Status: SHIPPED | OUTPATIENT
Start: 2021-09-24 | End: 2021-11-22

## 2021-09-24 NOTE — TELEPHONE ENCOUNTER
Pt is calling again for her medication has not been sent yet and needs it for her to start for the Tuesday for her dental procedure.

## 2021-09-24 NOTE — TELEPHONE ENCOUNTER
Refill request for:    Requested Prescriptions     Pending Prescriptions Disp Refills   • amoxicillin 500 MG Oral Cap 4 capsule 0     Sig: Take 2000 mg 1 hour before the dental procedure        Last Prescribed Quantity Refills   3/4/2021 4 0     LOV 6/23/2

## 2021-10-08 DIAGNOSIS — I10 ESSENTIAL HYPERTENSION: ICD-10-CM

## 2021-10-08 RX ORDER — LOSARTAN POTASSIUM AND HYDROCHLOROTHIAZIDE 12.5; 1 MG/1; MG/1
TABLET ORAL
Qty: 45 TABLET | Refills: 0 | Status: SHIPPED | OUTPATIENT
Start: 2021-10-08

## 2021-10-13 ENCOUNTER — TELEPHONE (OUTPATIENT)
Dept: FAMILY MEDICINE CLINIC | Facility: CLINIC | Age: 71
End: 2021-10-13

## 2021-10-13 NOTE — TELEPHONE ENCOUNTER
PT heard on the news one should stop taking the low dose aspirin. Pt takes it 3 x a week. Pt would like to know if she needs to continue taking it or not.

## 2021-10-13 NOTE — TELEPHONE ENCOUNTER
patient called back I went over what Dr Florentino Solorzano said and she will continue taking the ASA 3 times a week.

## 2021-10-13 NOTE — TELEPHONE ENCOUNTER
The guidance is to avoid the aspirin for routine prevention of the first heart attack. If she is taking it for cardiac reasons and was started by a neurologist or a cardiologist, she should continue it.     If she is taking it for her history of colon poly

## 2021-10-25 NOTE — TELEPHONE ENCOUNTER
1. Laboratory examination ordered as part of a routine general medical examination (Primary)  -     Comp Metabolic Panel (14); Future; Expected date: 10/25/2021  -     Lipid Panel;  Future; Expected date: 10/25/2021  -     TSH W Reflex To Free T4; Future; E

## 2021-11-15 ENCOUNTER — LAB ENCOUNTER (OUTPATIENT)
Dept: LAB | Age: 71
End: 2021-11-15
Attending: FAMILY MEDICINE
Payer: MEDICARE

## 2021-11-15 ENCOUNTER — TELEPHONE (OUTPATIENT)
Dept: FAMILY MEDICINE CLINIC | Facility: CLINIC | Age: 71
End: 2021-11-15

## 2021-11-15 DIAGNOSIS — Z00.00 LABORATORY EXAMINATION ORDERED AS PART OF A ROUTINE GENERAL MEDICAL EXAMINATION: ICD-10-CM

## 2021-11-15 PROCEDURE — 36415 COLL VENOUS BLD VENIPUNCTURE: CPT

## 2021-11-15 PROCEDURE — 84443 ASSAY THYROID STIM HORMONE: CPT

## 2021-11-15 PROCEDURE — 80053 COMPREHEN METABOLIC PANEL: CPT

## 2021-11-15 PROCEDURE — 80061 LIPID PANEL: CPT

## 2021-11-15 PROCEDURE — 85027 COMPLETE CBC AUTOMATED: CPT

## 2021-11-22 ENCOUNTER — OFFICE VISIT (OUTPATIENT)
Dept: FAMILY MEDICINE CLINIC | Facility: CLINIC | Age: 71
End: 2021-11-22
Payer: MEDICARE

## 2021-11-22 VITALS
SYSTOLIC BLOOD PRESSURE: 134 MMHG | BODY MASS INDEX: 30.29 KG/M2 | RESPIRATION RATE: 18 BRPM | HEIGHT: 56 IN | HEART RATE: 80 BPM | DIASTOLIC BLOOD PRESSURE: 70 MMHG | WEIGHT: 134.63 LBS

## 2021-11-22 DIAGNOSIS — E78.00 PURE HYPERCHOLESTEROLEMIA: ICD-10-CM

## 2021-11-22 DIAGNOSIS — Z12.31 ENCOUNTER FOR SCREENING MAMMOGRAM FOR MALIGNANT NEOPLASM OF BREAST: ICD-10-CM

## 2021-11-22 DIAGNOSIS — I10 ESSENTIAL HYPERTENSION: ICD-10-CM

## 2021-11-22 DIAGNOSIS — Z00.00 ANNUAL PHYSICAL EXAM: Primary | ICD-10-CM

## 2021-11-22 DIAGNOSIS — M85.831 OSTEOPENIA OF BOTH FOREARMS: ICD-10-CM

## 2021-11-22 DIAGNOSIS — M85.832 OSTEOPENIA OF BOTH FOREARMS: ICD-10-CM

## 2021-11-22 DIAGNOSIS — Z78.0 ASYMPTOMATIC MENOPAUSE: ICD-10-CM

## 2021-11-22 DIAGNOSIS — F33.42 RECURRENT MAJOR DEPRESSIVE DISORDER, IN FULL REMISSION (HCC): ICD-10-CM

## 2021-11-22 DIAGNOSIS — Z00.00 ENCOUNTER FOR ANNUAL HEALTH EXAMINATION: ICD-10-CM

## 2021-11-22 PROCEDURE — 99214 OFFICE O/P EST MOD 30 MIN: CPT | Performed by: FAMILY MEDICINE

## 2021-11-22 PROCEDURE — G0439 PPPS, SUBSEQ VISIT: HCPCS | Performed by: FAMILY MEDICINE

## 2021-11-22 RX ORDER — AMOXICILLIN 500 MG/1
CAPSULE ORAL
Qty: 4 CAPSULE | Refills: 3 | Status: SHIPPED | OUTPATIENT
Start: 2021-11-22

## 2021-11-22 RX ORDER — CEPHALEXIN 500 MG/1
500 CAPSULE ORAL 3 TIMES DAILY
Qty: 21 CAPSULE | Refills: 0 | Status: SHIPPED | OUTPATIENT
Start: 2021-11-22 | End: 2021-11-29

## 2021-11-22 NOTE — PROGRESS NOTES
HPI:   Nikhil Mills is a 65year old female who presents for a Medicare Subsequent Annual Wellness visit (Pt already had Initial Annual Wellness).     Lois Kirby in June on greass 2 days after passing a balance testing  Her last annual assessment has been over 1 y Osteopenia     DDD (degenerative disc disease), lumbar     Spinal stenosis of lumbar region     Lumbar radiculitis     Family history of malignant neoplasm of digestive organ     Chronic knee pain after total replacement of right knee joint     Recurrent m daily.  Cholecalciferol (VITAMIN D OR), Take 1 capsule by mouth daily.  2000 mg daily        MEDICAL INFORMATION:   She  has a past medical history of Arthritis, Colon polyp, High blood pressure, High cholesterol, HYPERTENSION, Osteoarthritis, OSTEOPENIA (1 dysuria. Musculoskeletal: Negative for arthralgias. Skin: Negative. Negative for rash. Allergic/Immunologic: Negative. Neurological: Negative.           EXAM:   /70   Pulse 80   Resp 18   Ht 4' 8\" (1.422 m)   Wt 134 lb 9.6 oz (61.1 kg)   BM 2+ on the right side and 2+ on the left side. Dorsalis pedis pulses are 2+ on the right side and 2+ on the left side. Posterior tibial pulses are 2+ on the right side and 2+ on the left side.       Heart sounds: Normal heart sounds, S1 normal OLDER PRSV FREE SINGLE D (00285) FLU CLINIC 09/08/2020   • Hyaluronan Or Derivative, Monovisc 01/25/2019   • Influenza 10/06/2009, 10/12/2010, 10/13/2011, 11/09/2012, 11/12/2013, 09/15/2017, 09/14/2018   • Pneumococcal (Prevnar 13) 11/13/2015   • Pneumovax Encounter for screening mammogram for malignant neoplasm of breast   -     LEILA SCREENING BILAT (CPT=77067);  Future; Expected date: 11/22/2021  Other orders  -     Amoxicillin; TAKE FOUR CAPSULES BY MOUTH ONE HOUR BEFORE APPOINTMENT must be seen for further every 5 years for all Medicare beneficiaries without apparent signs or symptoms of cardiovascular disease Lab Results   Component Value Date    CHOLEST 243 (H) 11/15/2021    HDL 70 (H) 11/15/2021     (H) 11/15/2021    TRIG 127 11/15/2021         [place] No recommendations at this time    Hepatitis B One screening covered for patients with certain risk factors   -  No recommendations at this time    Tetanus Toxoid Not covered by Medicare Part B unless medically necessary (cut with metal); may be covere

## 2021-11-22 NOTE — PATIENT INSTRUCTIONS
Tumeric- OTC to help with arthritis  Mich Garcia's SCREENING SCHEDULE   Tests on this list are recommended by your physician but may not be covered, or covered at this frequency, by your insurer.    Please check with your insurance carrier before scheduling (Steroids) Last Dexa Scan:    XR DEXA BONE DENSITOMETRY (CPT=77080) 12/03/2019      No recommendations at this time   Pap and Pelvic    Pap   Covered every 2 years for women at normal risk;  Annually if at high risk -  No recommendations at this time    [place] [employer] website. http://www. idph.state. [place]/public/books/advin.htm  A link to the [employer]. This site has a lot of good information including definitions of the different types of Advance Directives.  It also has the [facility] sleep.  · Think of going to bed as relaxing and enjoyable. Sleep will come sooner. · If your worries don’t let you sleep, write them down in a diary. Then close it, and go to bed. · Make sure the room is not too hot or too cold.  If it’s not dark enough,

## 2021-12-07 ENCOUNTER — HOSPITAL ENCOUNTER (OUTPATIENT)
Dept: BONE DENSITY | Age: 71
Discharge: HOME | End: 2021-12-07
Attending: FAMILY MEDICINE
Payer: MEDICARE

## 2021-12-07 DIAGNOSIS — M85.832 OSTEOPENIA OF BOTH FOREARMS: ICD-10-CM

## 2021-12-07 DIAGNOSIS — M85.831 OSTEOPENIA OF BOTH FOREARMS: ICD-10-CM

## 2021-12-07 PROCEDURE — 77080 DXA BONE DENSITY AXIAL: CPT | Performed by: FAMILY MEDICINE

## 2022-02-22 ENCOUNTER — ORDER TRANSCRIPTION (OUTPATIENT)
Dept: ADMINISTRATIVE | Facility: HOSPITAL | Age: 72
End: 2022-02-22

## 2022-02-23 ENCOUNTER — HOSPITAL ENCOUNTER (OUTPATIENT)
Dept: CT IMAGING | Facility: HOSPITAL | Age: 72
Discharge: HOME | End: 2022-02-23
Attending: FAMILY MEDICINE

## 2022-02-23 DIAGNOSIS — Z13.6 SCREENING FOR CARDIOVASCULAR CONDITION: ICD-10-CM

## 2022-02-23 DIAGNOSIS — Z13.6 ENCOUNTER FOR SCREENING FOR CARDIOVASCULAR DISORDERS: ICD-10-CM

## 2022-02-23 PROBLEM — I51.5 CARDIAC CALCIFICATION (HCC): Status: ACTIVE | Noted: 2022-02-23

## 2022-02-25 ENCOUNTER — TELEPHONE (OUTPATIENT)
Dept: FAMILY MEDICINE CLINIC | Facility: CLINIC | Age: 72
End: 2022-02-25

## 2022-02-25 PROBLEM — I70.0 THORACIC AORTA ATHEROSCLEROSIS: Status: ACTIVE | Noted: 2022-02-25

## 2022-02-25 PROBLEM — I70.0 THORACIC AORTA ATHEROSCLEROSIS (HCC): Status: ACTIVE | Noted: 2022-02-25

## 2022-03-06 ENCOUNTER — PATIENT MESSAGE (OUTPATIENT)
Dept: FAMILY MEDICINE CLINIC | Facility: CLINIC | Age: 72
End: 2022-03-06

## 2022-03-07 NOTE — TELEPHONE ENCOUNTER
Patient calling to discuss. Reports she has a cold since Thursday. Does not feel achey or real sick. Feels it in her nose and eyes. Denies fever or cough. Taking tylenol and cough drops. Instructed to take coricidin hbp. If no improvement in several days or any worsening or new symptoms will need to be seen. Patient notified. Patient verbalized understanding of information given.

## 2022-03-07 NOTE — TELEPHONE ENCOUNTER
From: Jackson Youssef  To: Rin Calvert MD  Sent: 3/6/2022 5:54 PM CST  Subject: Phill Waldron, Dr. Rob Mills, I started to have a cold on Thursday. I took a COVID test, and it was negative. It is now Sunday. I am still sneezing and constantly blowing my nose. Is there any medicine that you can prescribe for me.      Thank you,  Jackson Youssef

## 2022-04-01 ENCOUNTER — HOSPITAL ENCOUNTER (OUTPATIENT)
Dept: ULTRASOUND IMAGING | Age: 72
Discharge: HOME | End: 2022-04-01
Attending: FAMILY MEDICINE
Payer: MEDICARE

## 2022-04-01 DIAGNOSIS — Z13.6 SCREENING FOR CARDIOVASCULAR CONDITION: ICD-10-CM

## 2022-04-07 ENCOUNTER — TELEPHONE (OUTPATIENT)
Dept: FAMILY MEDICINE CLINIC | Facility: CLINIC | Age: 72
End: 2022-04-07

## 2022-04-07 ENCOUNTER — OFFICE VISIT (OUTPATIENT)
Dept: FAMILY MEDICINE CLINIC | Facility: CLINIC | Age: 72
End: 2022-04-07
Payer: MEDICARE

## 2022-04-07 VITALS
BODY MASS INDEX: 29.06 KG/M2 | RESPIRATION RATE: 18 BRPM | HEIGHT: 56 IN | SYSTOLIC BLOOD PRESSURE: 124 MMHG | DIASTOLIC BLOOD PRESSURE: 72 MMHG | WEIGHT: 129.19 LBS | HEART RATE: 80 BPM

## 2022-04-07 DIAGNOSIS — M81.0 AGE-RELATED OSTEOPOROSIS WITHOUT CURRENT PATHOLOGICAL FRACTURE: ICD-10-CM

## 2022-04-07 DIAGNOSIS — I10 ESSENTIAL HYPERTENSION: Primary | ICD-10-CM

## 2022-04-07 DIAGNOSIS — I51.5 CARDIAC CALCIFICATION (HCC): ICD-10-CM

## 2022-04-07 DIAGNOSIS — Z00.00 LABORATORY EXAMINATION ORDERED AS PART OF A ROUTINE GENERAL MEDICAL EXAMINATION: ICD-10-CM

## 2022-04-07 DIAGNOSIS — E78.00 PURE HYPERCHOLESTEROLEMIA: ICD-10-CM

## 2022-04-07 DIAGNOSIS — I70.0 THORACIC AORTA ATHEROSCLEROSIS (HCC): ICD-10-CM

## 2022-04-07 DIAGNOSIS — E78.00 PURE HYPERCHOLESTEROLEMIA: Primary | ICD-10-CM

## 2022-04-07 DIAGNOSIS — R73.9 HYPERGLYCEMIA: ICD-10-CM

## 2022-04-07 DIAGNOSIS — Z51.81 MEDICATION MONITORING ENCOUNTER: ICD-10-CM

## 2022-04-07 DIAGNOSIS — F33.42 RECURRENT MAJOR DEPRESSIVE DISORDER, IN FULL REMISSION (HCC): ICD-10-CM

## 2022-04-07 PROCEDURE — 99214 OFFICE O/P EST MOD 30 MIN: CPT | Performed by: FAMILY MEDICINE

## 2022-04-07 RX ORDER — ROSUVASTATIN CALCIUM 5 MG/1
5 TABLET, COATED ORAL NIGHTLY
Qty: 30 TABLET | Refills: 3 | Status: SHIPPED | OUTPATIENT
Start: 2022-04-07

## 2022-04-07 RX ORDER — IBANDRONATE SODIUM 150 MG/1
150 TABLET, FILM COATED ORAL
Qty: 3 TABLET | Refills: 4 | Status: SHIPPED | OUTPATIENT
Start: 2022-04-07

## 2022-04-07 NOTE — ASSESSMENT & PLAN NOTE
With changes recently seen she is willing to try low-dose we will try rosuvastatin 5 mg, half tablet twice weekly and gradually titrate and possibly add coenzyme Q 10 if muscle pain returns

## 2022-04-07 NOTE — TELEPHONE ENCOUNTER
Please enter lab orders for the patient's upcoming physical appointment. Physical scheduled: Your appointments     Date & Time Appointment Department [facility])    Nov 10, 2022 10:00 AM CST Medicare Annual Well Visit with MD Vasquez Mckinn[address], [address], [place]  ([address])            L[place] Chen [address] [address] [place] [address] [phone]         Preferred lab: [facility]  [facility])     The patient has been notified to complete fasting labs prior to their physical appointment.

## 2022-04-11 ENCOUNTER — TELEPHONE (OUTPATIENT)
Dept: FAMILY MEDICINE CLINIC | Facility: CLINIC | Age: 72
End: 2022-04-11

## 2022-04-11 NOTE — TELEPHONE ENCOUNTER
Pt calling to ask if she is able to take only half of the Boniva since she is so small. She had discussed with Dr. Rajat Kuo that she should start taking the medication once a month at [address] [place] [address] 4/7/22.  Please advise

## 2022-04-25 RX ORDER — LOSARTAN POTASSIUM AND HYDROCHLOROTHIAZIDE 12.5; 1 MG/1; MG/1
TABLET ORAL
Qty: 45 TABLET | Refills: 0 | Status: SHIPPED | OUTPATIENT
Start: 2022-04-25

## 2022-05-31 ENCOUNTER — TELEPHONE (OUTPATIENT)
Dept: FAMILY MEDICINE CLINIC | Facility: CLINIC | Age: 72
End: 2022-05-31

## 2022-05-31 DIAGNOSIS — Z12.31 SCREENING MAMMOGRAM FOR BREAST CANCER: Primary | ICD-10-CM

## 2022-05-31 NOTE — TELEPHONE ENCOUNTER
Patient is requesting an order for her annual screening mammogram.    Patient has been notified to allow 2-3 business days for order placement. Reviewed with patient that she may schedule mammogram via [facility] or by calling Central Scheduling at that time. Request for mammogram order routed to triage.

## 2022-06-15 ENCOUNTER — HOSPITAL ENCOUNTER (OUTPATIENT)
Dept: MAMMOGRAPHY | Age: 72
Discharge: HOME | End: 2022-06-15
Attending: FAMILY MEDICINE
Payer: MEDICARE

## 2022-06-15 DIAGNOSIS — Z12.31 SCREENING MAMMOGRAM FOR BREAST CANCER: ICD-10-CM

## 2022-06-15 PROCEDURE — 77067 SCR MAMMO BI INCL CAD: CPT | Performed by: FAMILY MEDICINE

## 2022-06-15 PROCEDURE — 77063 BREAST TOMOSYNTHESIS BI: CPT | Performed by: FAMILY MEDICINE

## 2022-09-19 ENCOUNTER — LAB ENCOUNTER (OUTPATIENT)
Dept: LAB | Age: 72
End: 2022-09-19
Attending: DERMATOLOGY

## 2022-09-19 DIAGNOSIS — D69.2 AUTOERYTHROCYTE SENSITIVITY DISORDER (HCC): Primary | ICD-10-CM

## 2022-09-19 LAB
ALBUMIN SERPL-MCNC: 3.9 G/DL (ref 3.4–5)
ALBUMIN/GLOB SERPL: 1.3 {RATIO} (ref 1–2)
ALP LIVER SERPL-CCNC: 38 U/L
ALT SERPL-CCNC: 37 U/L
ANION GAP SERPL CALC-SCNC: 5 MMOL/L (ref 0–18)
AST SERPL-CCNC: 20 U/L (ref 15–37)
BASOPHILS # BLD AUTO: 0.07 X10(3) UL (ref 0–0.2)
BASOPHILS NFR BLD AUTO: 0.7 %
BILIRUB SERPL-MCNC: 0.6 MG/DL (ref 0.1–2)
BUN BLD-MCNC: 16 MG/DL (ref 7–18)
BUN/CREAT SERPL: 22.9 (ref 10–20)
CALCIUM BLD-MCNC: 9 MG/DL (ref 8.5–10.1)
CHLORIDE SERPL-SCNC: 107 MMOL/L (ref 98–112)
CO2 SERPL-SCNC: 28 MMOL/L (ref 21–32)
CREAT BLD-MCNC: 0.7 MG/DL
DEPRECATED RDW RBC AUTO: 42.9 FL (ref 35.1–46.3)
EOSINOPHIL # BLD AUTO: 0.1 X10(3) UL (ref 0–0.7)
EOSINOPHIL NFR BLD AUTO: 1 %
ERYTHROCYTE [DISTWIDTH] IN BLOOD BY AUTOMATED COUNT: 12.6 % (ref 11–15)
FASTING STATUS PATIENT QL REPORTED: NO
GFR SERPLBLD BASED ON 1.73 SQ M-ARVRAT: 91 ML/MIN/1.73M2 (ref 60–?)
GLOBULIN PLAS-MCNC: 2.9 G/DL (ref 2.8–4.4)
GLUCOSE BLD-MCNC: 106 MG/DL (ref 70–99)
HCT VFR BLD AUTO: 41.8 %
HGB BLD-MCNC: 14.3 G/DL
IMM GRANULOCYTES # BLD AUTO: 0.05 X10(3) UL (ref 0–1)
IMM GRANULOCYTES NFR BLD: 0.5 %
LYMPHOCYTES # BLD AUTO: 1.95 X10(3) UL (ref 1–4)
LYMPHOCYTES NFR BLD AUTO: 20.4 %
MCH RBC QN AUTO: 31.6 PG (ref 26–34)
MCHC RBC AUTO-ENTMCNC: 34.2 G/DL (ref 31–37)
MCV RBC AUTO: 92.5 FL
MONOCYTES # BLD AUTO: 1.04 X10(3) UL (ref 0.1–1)
MONOCYTES NFR BLD AUTO: 10.9 %
NEUTROPHILS # BLD AUTO: 6.35 X10 (3) UL (ref 1.5–7.7)
NEUTROPHILS # BLD AUTO: 6.35 X10(3) UL (ref 1.5–7.7)
NEUTROPHILS NFR BLD AUTO: 66.5 %
OSMOLALITY SERPL CALC.SUM OF ELEC: 292 MOSM/KG (ref 275–295)
PLATELET # BLD AUTO: 290 10(3)UL (ref 150–450)
POTASSIUM SERPL-SCNC: 4 MMOL/L (ref 3.5–5.1)
PROT SERPL-MCNC: 6.8 G/DL (ref 6.4–8.2)
RBC # BLD AUTO: 4.52 X10(6)UL
SODIUM SERPL-SCNC: 140 MMOL/L (ref 136–145)
WBC # BLD AUTO: 9.6 X10(3) UL (ref 4–11)

## 2022-09-19 PROCEDURE — 36415 COLL VENOUS BLD VENIPUNCTURE: CPT

## 2022-09-19 PROCEDURE — 80053 COMPREHEN METABOLIC PANEL: CPT

## 2022-09-19 PROCEDURE — 85025 COMPLETE CBC W/AUTO DIFF WBC: CPT

## 2022-09-23 ENCOUNTER — OFFICE VISIT (OUTPATIENT)
Dept: FAMILY MEDICINE CLINIC | Facility: CLINIC | Age: 72
End: 2022-09-23

## 2022-09-23 VITALS
RESPIRATION RATE: 18 BRPM | SYSTOLIC BLOOD PRESSURE: 130 MMHG | HEART RATE: 74 BPM | HEIGHT: 56 IN | DIASTOLIC BLOOD PRESSURE: 76 MMHG | WEIGHT: 131 LBS | BODY MASS INDEX: 29.47 KG/M2

## 2022-09-23 DIAGNOSIS — Z11.59 SCREENING FOR VIRAL DISEASE: ICD-10-CM

## 2022-09-23 DIAGNOSIS — Z00.00 LABORATORY EXAMINATION ORDERED AS PART OF A ROUTINE GENERAL MEDICAL EXAMINATION: ICD-10-CM

## 2022-09-23 DIAGNOSIS — R73.9 HYPERGLYCEMIA: ICD-10-CM

## 2022-09-23 DIAGNOSIS — R23.3 EASY BRUISING: Primary | ICD-10-CM

## 2022-09-23 DIAGNOSIS — E78.00 PURE HYPERCHOLESTEROLEMIA: ICD-10-CM

## 2022-10-05 DIAGNOSIS — I10 ESSENTIAL HYPERTENSION: ICD-10-CM

## 2022-10-06 RX ORDER — LOSARTAN POTASSIUM AND HYDROCHLOROTHIAZIDE 12.5; 1 MG/1; MG/1
TABLET ORAL
Qty: 45 TABLET | Refills: 0 | Status: SHIPPED | OUTPATIENT
Start: 2022-10-06

## 2022-10-10 NOTE — TELEPHONE ENCOUNTER
1. Pure hypercholesterolemia (Primary)  Overview:  Side effects on statins including rosuvastatin intolerance. Also same SE on Zetia. Orders:  -     Comp Metabolic Panel (14); Future; Expected date: 10/10/2022  -     Lipid Panel; Future; Expected date: 10/10/2022  -     TSH W Reflex To Free T4; Future; Expected date: 10/10/2022  2. Age-related osteoporosis without current pathological fracture  Overview:  Dexa -2.4 in forearm in 2017, -2.6 in 12/2021  Orders:  -     CBC, Platelet; No Differential; Future; Expected date: 10/10/2022  3. Medication monitoring encounter   -     CBC, Platelet; No Differential; Future; Expected date: 10/10/2022  4. Laboratory examination ordered as part of a routine general medical examination  -     Comp Metabolic Panel (14); Future; Expected date: 10/10/2022  -     Lipid Panel; Future; Expected date: 10/10/2022  -     CBC, Platelet; No Differential; Future; Expected date: 10/10/2022  -     TSH W Reflex To Free T4; Future; Expected date: 10/10/2022  5. Hyperglycemia  -     Hemoglobin A1C; Future; Expected date: 10/10/2022       OK to notify.  Thanks, Manuelito Santos MD

## 2022-11-02 ENCOUNTER — LAB ENCOUNTER (OUTPATIENT)
Dept: LAB | Age: 72
End: 2022-11-02
Attending: FAMILY MEDICINE
Payer: MEDICARE

## 2022-11-02 DIAGNOSIS — Z11.59 SCREENING FOR VIRAL DISEASE: ICD-10-CM

## 2022-11-02 DIAGNOSIS — Z51.81 MEDICATION MONITORING ENCOUNTER: ICD-10-CM

## 2022-11-02 DIAGNOSIS — R23.3 EASY BRUISING: ICD-10-CM

## 2022-11-02 DIAGNOSIS — M81.0 AGE-RELATED OSTEOPOROSIS WITHOUT CURRENT PATHOLOGICAL FRACTURE: ICD-10-CM

## 2022-11-02 DIAGNOSIS — R73.9 HYPERGLYCEMIA: ICD-10-CM

## 2022-11-02 DIAGNOSIS — E78.00 PURE HYPERCHOLESTEROLEMIA: ICD-10-CM

## 2022-11-02 DIAGNOSIS — Z00.00 LABORATORY EXAMINATION ORDERED AS PART OF A ROUTINE GENERAL MEDICAL EXAMINATION: ICD-10-CM

## 2022-11-02 LAB
ALBUMIN SERPL-MCNC: 4.4 G/DL (ref 3.4–5)
ALBUMIN/GLOB SERPL: 1.6 {RATIO} (ref 1–2)
ALP LIVER SERPL-CCNC: 49 U/L
ALT SERPL-CCNC: 34 U/L
ANION GAP SERPL CALC-SCNC: 7 MMOL/L (ref 0–18)
AST SERPL-CCNC: 21 U/L (ref 15–37)
BILIRUB SERPL-MCNC: 0.9 MG/DL (ref 0.1–2)
BUN BLD-MCNC: 14 MG/DL (ref 7–18)
BUN/CREAT SERPL: 21.9 (ref 10–20)
CALCIUM BLD-MCNC: 9.8 MG/DL (ref 8.5–10.1)
CHLORIDE SERPL-SCNC: 104 MMOL/L (ref 98–112)
CHOLEST SERPL-MCNC: 232 MG/DL (ref ?–200)
CO2 SERPL-SCNC: 28 MMOL/L (ref 21–32)
CREAT BLD-MCNC: 0.64 MG/DL
DEPRECATED RDW RBC AUTO: 42 FL (ref 35.1–46.3)
ERYTHROCYTE [DISTWIDTH] IN BLOOD BY AUTOMATED COUNT: 12.3 % (ref 11–15)
EST. AVERAGE GLUCOSE BLD GHB EST-MCNC: 108 MG/DL (ref 68–126)
FASTING PATIENT LIPID ANSWER: YES
FASTING STATUS PATIENT QL REPORTED: YES
GFR SERPLBLD BASED ON 1.73 SQ M-ARVRAT: 93 ML/MIN/1.73M2 (ref 60–?)
GLOBULIN PLAS-MCNC: 2.8 G/DL (ref 2.8–4.4)
GLUCOSE BLD-MCNC: 89 MG/DL (ref 70–99)
HBA1C MFR BLD: 5.4 % (ref ?–5.7)
HCT VFR BLD AUTO: 44.3 %
HDLC SERPL-MCNC: 80 MG/DL (ref 40–59)
HGB BLD-MCNC: 15 G/DL
LDLC SERPL CALC-MCNC: 129 MG/DL (ref ?–100)
MCH RBC QN AUTO: 31.3 PG (ref 26–34)
MCHC RBC AUTO-ENTMCNC: 33.9 G/DL (ref 31–37)
MCV RBC AUTO: 92.3 FL
NONHDLC SERPL-MCNC: 152 MG/DL (ref ?–130)
OSMOLALITY SERPL CALC.SUM OF ELEC: 288 MOSM/KG (ref 275–295)
PLATELET # BLD AUTO: 300 10(3)UL (ref 150–450)
POTASSIUM SERPL-SCNC: 4.3 MMOL/L (ref 3.5–5.1)
PROT SERPL-MCNC: 7.2 G/DL (ref 6.4–8.2)
RBC # BLD AUTO: 4.8 X10(6)UL
SODIUM SERPL-SCNC: 139 MMOL/L (ref 136–145)
TRIGL SERPL-MCNC: 133 MG/DL (ref 30–149)
TSI SER-ACNC: 1.26 MIU/ML (ref 0.36–3.74)
VLDLC SERPL CALC-MCNC: 24 MG/DL (ref 0–30)
WBC # BLD AUTO: 7.9 X10(3) UL (ref 4–11)

## 2022-11-02 PROCEDURE — 83036 HEMOGLOBIN GLYCOSYLATED A1C: CPT

## 2022-11-02 PROCEDURE — 80061 LIPID PANEL: CPT

## 2022-11-02 PROCEDURE — 86787 VARICELLA-ZOSTER ANTIBODY: CPT

## 2022-11-02 PROCEDURE — 85027 COMPLETE CBC AUTOMATED: CPT

## 2022-11-02 PROCEDURE — 80053 COMPREHEN METABOLIC PANEL: CPT

## 2022-11-02 PROCEDURE — 84443 ASSAY THYROID STIM HORMONE: CPT

## 2022-11-02 PROCEDURE — 36415 COLL VENOUS BLD VENIPUNCTURE: CPT

## 2022-11-04 LAB — VZV IGG SER IA-ACNC: 1607 (ref 165–?)

## 2022-11-10 ENCOUNTER — OFFICE VISIT (OUTPATIENT)
Dept: FAMILY MEDICINE CLINIC | Facility: CLINIC | Age: 72
End: 2022-11-10
Payer: MEDICARE

## 2022-11-10 VITALS
SYSTOLIC BLOOD PRESSURE: 134 MMHG | HEART RATE: 84 BPM | HEIGHT: 56 IN | RESPIRATION RATE: 18 BRPM | BODY MASS INDEX: 28.98 KG/M2 | DIASTOLIC BLOOD PRESSURE: 80 MMHG | WEIGHT: 128.81 LBS

## 2022-11-10 DIAGNOSIS — N39.0 RECURRENT UTI: ICD-10-CM

## 2022-11-10 DIAGNOSIS — Z00.00 ENCOUNTER FOR ANNUAL HEALTH EXAMINATION: ICD-10-CM

## 2022-11-10 DIAGNOSIS — I10 ESSENTIAL HYPERTENSION: ICD-10-CM

## 2022-11-10 DIAGNOSIS — I51.5 CARDIAC CALCIFICATION (HCC): ICD-10-CM

## 2022-11-10 DIAGNOSIS — I70.0 THORACIC AORTA ATHEROSCLEROSIS (HCC): ICD-10-CM

## 2022-11-10 DIAGNOSIS — Z12.31 VISIT FOR SCREENING MAMMOGRAM: ICD-10-CM

## 2022-11-10 DIAGNOSIS — Z00.00 ANNUAL PHYSICAL EXAM: Primary | ICD-10-CM

## 2022-11-10 DIAGNOSIS — E78.00 PURE HYPERCHOLESTEROLEMIA: ICD-10-CM

## 2022-11-10 DIAGNOSIS — F33.42 RECURRENT MAJOR DEPRESSIVE DISORDER, IN FULL REMISSION (HCC): ICD-10-CM

## 2022-11-10 DIAGNOSIS — M81.0 AGE-RELATED OSTEOPOROSIS WITHOUT CURRENT PATHOLOGICAL FRACTURE: ICD-10-CM

## 2022-11-10 PROCEDURE — 1125F AMNT PAIN NOTED PAIN PRSNT: CPT | Performed by: FAMILY MEDICINE

## 2022-11-10 PROCEDURE — 99214 OFFICE O/P EST MOD 30 MIN: CPT | Performed by: FAMILY MEDICINE

## 2022-11-10 PROCEDURE — G0439 PPPS, SUBSEQ VISIT: HCPCS | Performed by: FAMILY MEDICINE

## 2022-11-10 RX ORDER — ROSUVASTATIN CALCIUM 5 MG/1
5 TABLET, COATED ORAL NIGHTLY
Qty: 30 TABLET | Refills: 3 | Status: SHIPPED | OUTPATIENT
Start: 2022-11-10

## 2022-11-10 RX ORDER — CEPHALEXIN 500 MG/1
500 CAPSULE ORAL 3 TIMES DAILY
Qty: 21 CAPSULE | Refills: 0 | Status: SHIPPED | OUTPATIENT
Start: 2022-11-10 | End: 2022-11-17

## 2022-11-10 NOTE — ASSESSMENT & PLAN NOTE
Stable, Continue present management.     Cholesterol Lowering Medications          rosuvastatin 5 MG Oral Tab

## 2023-01-03 NOTE — PROGRESS NOTES
Quick Note:    Results given 3/8 in office  ______ [FreeTextEntry1] : 56-year-old woman initially referred because of 1+ protein on urinalysis in the setting of SLE.  However, no proteinuria was confirmed here even on urine microalbumin.  Her renal function is normal with a creatinine of 0.6, , U ACR of 15.  Her urinalysis is normal.  She received Plaquenil for almost a decade.  I had planned to start an ARB if proteinuria was confirmed, but since it was not found, she has not been treated with that.  She underwent left shoulder surgery in November and continues to have pain and limited mobility.  Her rheumatologist is Dr. Deya Alan.  Her BP remains normal.  It was 117/83 on December 12 on a physician visit, and 115/82 on a December 15 visit.

## 2023-02-03 ENCOUNTER — OFFICE VISIT (OUTPATIENT)
Facility: LOCATION | Age: 73
End: 2023-02-03
Payer: MEDICARE

## 2023-02-03 DIAGNOSIS — R13.19 ESOPHAGEAL DYSPHAGIA: ICD-10-CM

## 2023-02-03 DIAGNOSIS — H61.23 BILATERAL IMPACTED CERUMEN: Primary | ICD-10-CM

## 2023-02-03 PROCEDURE — 99214 OFFICE O/P EST MOD 30 MIN: CPT | Performed by: OTOLARYNGOLOGY

## 2023-02-03 PROCEDURE — 92504 EAR MICROSCOPY EXAMINATION: CPT | Performed by: OTOLARYNGOLOGY

## 2023-02-03 PROCEDURE — 31575 DIAGNOSTIC LARYNGOSCOPY: CPT | Performed by: OTOLARYNGOLOGY

## 2023-02-06 ENCOUNTER — PATIENT MESSAGE (OUTPATIENT)
Dept: FAMILY MEDICINE CLINIC | Facility: CLINIC | Age: 73
End: 2023-02-06

## 2023-02-07 ENCOUNTER — TELEPHONE (OUTPATIENT)
Dept: FAMILY MEDICINE CLINIC | Facility: CLINIC | Age: 73
End: 2023-02-07

## 2023-02-07 DIAGNOSIS — E78.00 PURE HYPERCHOLESTEROLEMIA: Primary | ICD-10-CM

## 2023-02-07 DIAGNOSIS — I10 ESSENTIAL HYPERTENSION: ICD-10-CM

## 2023-02-07 NOTE — TELEPHONE ENCOUNTER
From: Baldemar Khanna  To: Hannah Marx MD  Sent: 2/6/2023 1:34 PM CST  Subject: Joint Pain    Hi, Dr. Michele Correa, I have been taking Tylenol for my joint pain. Dr. Tiffanie Gonsales suggested Anali Abed. I said that I would check with you, and he thought that was a good idea. Which medication do you think would relieve the joint pain?     Thank you,  Baldemar Khanna

## 2023-02-07 NOTE — TELEPHONE ENCOUNTER
Pended requested labs, but unsure if there is a need to repeat all as they were just done 11/2021    Component      Latest Ref Rng & Units 11/2/2022   Glucose      70 - 99 mg/dL 89   Sodium      136 - 145 mmol/L 139   Potassium      3.5 - 5.1 mmol/L 4.3   Chloride      98 - 112 mmol/L 104   Carbon Dioxide, Total      21.0 - 32.0 mmol/L 28.0   ANION GAP      0 - 18 mmol/L 7   BUN      7 - 18 mg/dL 14   CREATININE      0.55 - 1.02 mg/dL 0.64   BUN/CREATININE RATIO      10.0 - 20.0 21.9 (H)   CALCIUM      8.5 - 10.1 mg/dL 9.8   CALCULATED OSMOLALITY      275 - 295 mOsm/kg 288   eGFR-Cr      >=60 mL/min/1.73m2 93   ALT (SGPT)      13 - 56 U/L 34   AST (SGOT)      15 - 37 U/L 21   ALKALINE PHOSPHATASE      55 - 142 U/L 49 (L)   Total Bilirubin      0.1 - 2.0 mg/dL 0.9   PROTEIN, TOTAL      6.4 - 8.2 g/dL 7.2   Albumin      3.4 - 5.0 g/dL 4.4   Globulin      2.8 - 4.4 g/dL 2.8   A/G Ratio      1.0 - 2.0 1.6   Patient Fasting for CMP? Yes   WBC      4.0 - 11.0 x10(3) uL 7.9   RBC      3.80 - 5.30 x10(6)uL 4.80   Hemoglobin      12.0 - 16.0 g/dL 15.0   Hematocrit      35.0 - 48.0 % 44.3   MCV      80.0 - 100.0 fL 92.3   MCH      26.0 - 34.0 pg 31.3   MCHC      31.0 - 37.0 g/dL 33.9   RDW      11.0 - 15.0 % 12.3   RDW-SD      35.1 - 46.3 fL 42.0   Platelet Count      417.2 - 450.0 10(3)uL 300.0   Cholesterol, Total      <200 mg/dL 232 (H)   HDL Cholesterol      40 - 59 mg/dL 80 (H)   Triglycerides      30 - 149 mg/dL 133   LDL Cholesterol Calc      <100 mg/dL 129 (H)   VLDL      0 - 30 mg/dL 24   NON-HDL CHOLESTEROL      <130 mg/dL 152 (H)   Patient Fasting for Lipid?        Yes   HEMOGLOBIN A1c      <5.7 % 5.4   ESTIMATED AVERAGE GLUCOSE      68 - 126 mg/dL 108   TSH      0.358 - 3.740 mIU/mL 1.260   V. ZOSTER IGG IMMUNITY      >165.00 1,607.00

## 2023-02-10 NOTE — TELEPHONE ENCOUNTER
If she is asking me now to place labs for May, I will not do it. I will postpone the note to show up 1 month early and I will not put them in now. I think this is the third time this message is come to me, please explain to her that the labs do not need to go into the system until closer to her May 11 appointment otherwise he confuses the lab people.

## 2023-02-27 ENCOUNTER — TELEPHONE (OUTPATIENT)
Dept: FAMILY MEDICINE CLINIC | Facility: CLINIC | Age: 73
End: 2023-02-27

## 2023-02-27 DIAGNOSIS — R73.9 HYPERGLYCEMIA: ICD-10-CM

## 2023-02-27 DIAGNOSIS — I10 ESSENTIAL HYPERTENSION: Primary | ICD-10-CM

## 2023-02-27 DIAGNOSIS — E78.00 PURE HYPERCHOLESTEROLEMIA: ICD-10-CM

## 2023-02-27 NOTE — TELEPHONE ENCOUNTER
Pt has appt.  On 3/17.23 for follow up and said she needs blood work done but not sure what is needed

## 2023-02-27 NOTE — TELEPHONE ENCOUNTER
1. Essential hypertension (Primary)  Overview:  Losartan HCTZ 100/12.5  2. Pure hypercholesterolemia  Overview:  Side effects on statins including rosuvastatin intolerance. Also same SE on Zetia. Orders:  -     Comp Metabolic Panel (14); Future; Expected date: 02/27/2023  -     Lipid Panel; Future; Expected date: 02/27/2023  -     TSH W Reflex To Free T4; Future; Expected date: 02/27/2023  3. Hyperglycemia  -     Hemoglobin A1C; Future; Expected date: 02/27/2023       OK to notify.  Thanks, Obed Gibson MD

## 2023-03-14 ENCOUNTER — LAB ENCOUNTER (OUTPATIENT)
Dept: LAB | Age: 73
End: 2023-03-14
Attending: FAMILY MEDICINE
Payer: MEDICARE

## 2023-03-14 DIAGNOSIS — E78.00 PURE HYPERCHOLESTEROLEMIA: ICD-10-CM

## 2023-03-14 DIAGNOSIS — R73.9 HYPERGLYCEMIA: ICD-10-CM

## 2023-03-14 LAB
ALBUMIN SERPL-MCNC: 3.9 G/DL (ref 3.4–5)
ALBUMIN/GLOB SERPL: 1.3 {RATIO} (ref 1–2)
ALP LIVER SERPL-CCNC: 38 U/L
ALT SERPL-CCNC: 36 U/L
ANION GAP SERPL CALC-SCNC: 6 MMOL/L (ref 0–18)
AST SERPL-CCNC: 31 U/L (ref 15–37)
BILIRUB SERPL-MCNC: 0.6 MG/DL (ref 0.1–2)
BUN BLD-MCNC: 13 MG/DL (ref 7–18)
CALCIUM BLD-MCNC: 9 MG/DL (ref 8.5–10.1)
CHLORIDE SERPL-SCNC: 105 MMOL/L (ref 98–112)
CHOLEST SERPL-MCNC: 224 MG/DL (ref ?–200)
CO2 SERPL-SCNC: 27 MMOL/L (ref 21–32)
CREAT BLD-MCNC: 0.65 MG/DL
EST. AVERAGE GLUCOSE BLD GHB EST-MCNC: 128 MG/DL (ref 68–126)
FASTING PATIENT LIPID ANSWER: YES
FASTING STATUS PATIENT QL REPORTED: YES
GFR SERPLBLD BASED ON 1.73 SQ M-ARVRAT: 92 ML/MIN/1.73M2 (ref 60–?)
GLOBULIN PLAS-MCNC: 3 G/DL (ref 2.8–4.4)
GLUCOSE BLD-MCNC: 92 MG/DL (ref 70–99)
HBA1C MFR BLD: 6.1 % (ref ?–5.7)
HDLC SERPL-MCNC: 84 MG/DL (ref 40–59)
LDLC SERPL CALC-MCNC: 118 MG/DL (ref ?–100)
NONHDLC SERPL-MCNC: 140 MG/DL (ref ?–130)
OSMOLALITY SERPL CALC.SUM OF ELEC: 286 MOSM/KG (ref 275–295)
POTASSIUM SERPL-SCNC: 4 MMOL/L (ref 3.5–5.1)
PROT SERPL-MCNC: 6.9 G/DL (ref 6.4–8.2)
SODIUM SERPL-SCNC: 138 MMOL/L (ref 136–145)
TRIGL SERPL-MCNC: 126 MG/DL (ref 30–149)
TSI SER-ACNC: 1.45 MIU/ML (ref 0.36–3.74)
VLDLC SERPL CALC-MCNC: 22 MG/DL (ref 0–30)

## 2023-03-14 PROCEDURE — 80053 COMPREHEN METABOLIC PANEL: CPT

## 2023-03-14 PROCEDURE — 83036 HEMOGLOBIN GLYCOSYLATED A1C: CPT

## 2023-03-14 PROCEDURE — 36415 COLL VENOUS BLD VENIPUNCTURE: CPT

## 2023-03-14 PROCEDURE — 84443 ASSAY THYROID STIM HORMONE: CPT

## 2023-03-14 PROCEDURE — 80061 LIPID PANEL: CPT

## 2023-03-17 ENCOUNTER — OFFICE VISIT (OUTPATIENT)
Dept: FAMILY MEDICINE CLINIC | Facility: CLINIC | Age: 73
End: 2023-03-17
Payer: MEDICARE

## 2023-03-17 VITALS
HEART RATE: 68 BPM | SYSTOLIC BLOOD PRESSURE: 128 MMHG | WEIGHT: 130.19 LBS | DIASTOLIC BLOOD PRESSURE: 80 MMHG | BODY MASS INDEX: 29.28 KG/M2 | RESPIRATION RATE: 18 BRPM | HEIGHT: 56 IN

## 2023-03-17 DIAGNOSIS — D69.2 AUTOERYTHROCYTE SENSITIVITY DISORDER (HCC): ICD-10-CM

## 2023-03-17 DIAGNOSIS — F33.42 RECURRENT MAJOR DEPRESSIVE DISORDER, IN FULL REMISSION (HCC): ICD-10-CM

## 2023-03-17 DIAGNOSIS — I51.5 CARDIAC CALCIFICATION (HCC): ICD-10-CM

## 2023-03-17 DIAGNOSIS — I10 ESSENTIAL HYPERTENSION: Primary | ICD-10-CM

## 2023-03-17 DIAGNOSIS — E78.00 PURE HYPERCHOLESTEROLEMIA: ICD-10-CM

## 2023-03-17 DIAGNOSIS — I70.0 THORACIC AORTA ATHEROSCLEROSIS (HCC): ICD-10-CM

## 2023-03-17 PROCEDURE — 99214 OFFICE O/P EST MOD 30 MIN: CPT | Performed by: FAMILY MEDICINE

## 2023-04-08 DIAGNOSIS — I10 ESSENTIAL HYPERTENSION: ICD-10-CM

## 2023-04-11 RX ORDER — LOSARTAN POTASSIUM AND HYDROCHLOROTHIAZIDE 12.5; 1 MG/1; MG/1
TABLET ORAL
Qty: 45 TABLET | Refills: 3 | Status: SHIPPED | OUTPATIENT
Start: 2023-04-11

## 2023-04-17 DIAGNOSIS — N39.0 RECURRENT UTI: ICD-10-CM

## 2023-04-17 NOTE — TELEPHONE ENCOUNTER
Cephalexin last prescribed 11/10/2022    10. Recurrent UTI  -     Cephalexin; Take 1 capsule (500 mg total) by mouth 3 (three) times daily for 7 days. Dispense: 21 capsule; Refill: 0        Called pt - she states she always keeps this on hand for UTIs. She hadn't had a UTI for 2 years, but had one over the weekend. She took the abx and sx improved. She is going away on vacation and requests a refill to have on hand with her.     Routed to Dr. Claire Dickerson for review

## 2023-04-17 NOTE — TELEPHONE ENCOUNTER
Pt is calling for a refill on her    cephalexin 500 MG Oral Cap     Please send to [address] [place] [address],[address], [address] [place] [address] [phone], [phone]

## 2023-04-19 RX ORDER — CEPHALEXIN 500 MG/1
500 CAPSULE ORAL 3 TIMES DAILY
Qty: 21 CAPSULE | Refills: 0 | Status: SHIPPED | OUTPATIENT
Start: 2023-04-19 | End: 2023-04-26

## 2023-05-23 ENCOUNTER — TELEPHONE (OUTPATIENT)
Dept: FAMILY MEDICINE CLINIC | Facility: CLINIC | Age: 73
End: 2023-05-23

## 2023-06-20 ENCOUNTER — HOSPITAL ENCOUNTER (OUTPATIENT)
Dept: MAMMOGRAPHY | Age: 73
Discharge: HOME | End: 2023-06-20
Attending: FAMILY MEDICINE
Payer: MEDICARE

## 2023-06-20 DIAGNOSIS — Z12.31 VISIT FOR SCREENING MAMMOGRAM: ICD-10-CM

## 2023-06-20 PROCEDURE — 77067 SCR MAMMO BI INCL CAD: CPT | Performed by: FAMILY MEDICINE

## 2023-06-20 PROCEDURE — 77063 BREAST TOMOSYNTHESIS BI: CPT | Performed by: FAMILY MEDICINE

## 2023-06-21 ENCOUNTER — OFFICE VISIT (OUTPATIENT)
Dept: FAMILY MEDICINE CLINIC | Facility: CLINIC | Age: 73
End: 2023-06-21
Payer: MEDICARE

## 2023-06-21 ENCOUNTER — LAB ENCOUNTER (OUTPATIENT)
Dept: LAB | Age: 73
End: 2023-06-21
Attending: FAMILY MEDICINE
Payer: MEDICARE

## 2023-06-21 VITALS
DIASTOLIC BLOOD PRESSURE: 76 MMHG | HEIGHT: 56 IN | SYSTOLIC BLOOD PRESSURE: 126 MMHG | WEIGHT: 126 LBS | RESPIRATION RATE: 20 BRPM | BODY MASS INDEX: 28.34 KG/M2 | HEART RATE: 96 BPM

## 2023-06-21 DIAGNOSIS — D69.2 AUTOERYTHROCYTE SENSITIVITY DISORDER (HCC): ICD-10-CM

## 2023-06-21 DIAGNOSIS — R53.83 FATIGUE, UNSPECIFIED TYPE: ICD-10-CM

## 2023-06-21 DIAGNOSIS — M81.0 AGE-RELATED OSTEOPOROSIS WITHOUT CURRENT PATHOLOGICAL FRACTURE: ICD-10-CM

## 2023-06-21 DIAGNOSIS — R23.3 PETECHIAL RASH: Primary | ICD-10-CM

## 2023-06-21 DIAGNOSIS — Z13.0 SCREENING FOR IRON DEFICIENCY ANEMIA: ICD-10-CM

## 2023-06-21 DIAGNOSIS — R23.3 PETECHIAL RASH: ICD-10-CM

## 2023-06-21 DIAGNOSIS — R23.9 UNSPECIFIED SKIN CHANGES: ICD-10-CM

## 2023-06-21 DIAGNOSIS — Z00.00 LABORATORY EXAMINATION ORDERED AS PART OF A ROUTINE GENERAL MEDICAL EXAMINATION: ICD-10-CM

## 2023-06-21 LAB
ALBUMIN SERPL-MCNC: 4 G/DL (ref 3.4–5)
ALBUMIN/GLOB SERPL: 1.3 {RATIO} (ref 1–2)
ALP LIVER SERPL-CCNC: 50 U/L
ALT SERPL-CCNC: 38 U/L
ANION GAP SERPL CALC-SCNC: 6 MMOL/L (ref 0–18)
AST SERPL-CCNC: 24 U/L (ref 15–37)
BASOPHILS # BLD AUTO: 0.04 X10(3) UL (ref 0–0.2)
BASOPHILS NFR BLD AUTO: 0.5 %
BILIRUB SERPL-MCNC: 0.8 MG/DL (ref 0.1–2)
BUN BLD-MCNC: 12 MG/DL (ref 7–18)
CALCIUM BLD-MCNC: 9.5 MG/DL (ref 8.5–10.1)
CHLORIDE SERPL-SCNC: 104 MMOL/L (ref 98–112)
CO2 SERPL-SCNC: 27 MMOL/L (ref 21–32)
CREAT BLD-MCNC: 0.7 MG/DL
CRP SERPL-MCNC: 0.45 MG/DL (ref ?–0.3)
DEPRECATED HBV CORE AB SER IA-ACNC: 491.3 NG/ML
EOSINOPHIL # BLD AUTO: 0.08 X10(3) UL (ref 0–0.7)
EOSINOPHIL NFR BLD AUTO: 1 %
ERYTHROCYTE [DISTWIDTH] IN BLOOD BY AUTOMATED COUNT: 12.6 %
ERYTHROCYTE [SEDIMENTATION RATE] IN BLOOD: 36 MM/HR
FASTING STATUS PATIENT QL REPORTED: NO
FOLATE SERPL-MCNC: 52.3 NG/ML (ref 8.7–?)
GFR SERPLBLD BASED ON 1.73 SQ M-ARVRAT: 91 ML/MIN/1.73M2 (ref 60–?)
GLOBULIN PLAS-MCNC: 3.2 G/DL (ref 2.8–4.4)
GLUCOSE BLD-MCNC: 105 MG/DL (ref 70–99)
HCT VFR BLD AUTO: 41.3 %
HGB BLD-MCNC: 14.1 G/DL
IMM GRANULOCYTES # BLD AUTO: 0.05 X10(3) UL (ref 0–1)
IMM GRANULOCYTES NFR BLD: 0.6 %
LYMPHOCYTES # BLD AUTO: 1.71 X10(3) UL (ref 1–4)
LYMPHOCYTES NFR BLD AUTO: 21.8 %
MCH RBC QN AUTO: 30.7 PG (ref 26–34)
MCHC RBC AUTO-ENTMCNC: 34.1 G/DL (ref 31–37)
MCV RBC AUTO: 90 FL
MONOCYTES # BLD AUTO: 0.94 X10(3) UL (ref 0.1–1)
MONOCYTES NFR BLD AUTO: 12 %
NEUTROPHILS # BLD AUTO: 5.03 X10 (3) UL (ref 1.5–7.7)
NEUTROPHILS # BLD AUTO: 5.03 X10(3) UL (ref 1.5–7.7)
NEUTROPHILS NFR BLD AUTO: 64.1 %
OSMOLALITY SERPL CALC.SUM OF ELEC: 284 MOSM/KG (ref 275–295)
PLATELET # BLD AUTO: 274 10(3)UL (ref 150–450)
POTASSIUM SERPL-SCNC: 4.1 MMOL/L (ref 3.5–5.1)
PROT SERPL-MCNC: 7.2 G/DL (ref 6.4–8.2)
RBC # BLD AUTO: 4.59 X10(6)UL
SODIUM SERPL-SCNC: 137 MMOL/L (ref 136–145)
TSI SER-ACNC: 1.88 MIU/ML (ref 0.36–3.74)
VIT B12 SERPL-MCNC: 363 PG/ML (ref 193–986)
VIT D+METAB SERPL-MCNC: 39.2 NG/ML (ref 30–100)
WBC # BLD AUTO: 7.9 X10(3) UL (ref 4–11)

## 2023-06-21 PROCEDURE — 85652 RBC SED RATE AUTOMATED: CPT

## 2023-06-21 PROCEDURE — 82746 ASSAY OF FOLIC ACID SERUM: CPT

## 2023-06-21 PROCEDURE — 86140 C-REACTIVE PROTEIN: CPT

## 2023-06-21 PROCEDURE — 84443 ASSAY THYROID STIM HORMONE: CPT

## 2023-06-21 PROCEDURE — 82728 ASSAY OF FERRITIN: CPT

## 2023-06-21 PROCEDURE — 99213 OFFICE O/P EST LOW 20 MIN: CPT | Performed by: FAMILY MEDICINE

## 2023-06-21 PROCEDURE — 80053 COMPREHEN METABOLIC PANEL: CPT

## 2023-06-21 PROCEDURE — 85025 COMPLETE CBC W/AUTO DIFF WBC: CPT

## 2023-06-21 PROCEDURE — 82306 VITAMIN D 25 HYDROXY: CPT

## 2023-06-21 PROCEDURE — 82607 VITAMIN B-12: CPT

## 2023-06-21 PROCEDURE — 36415 COLL VENOUS BLD VENIPUNCTURE: CPT

## 2023-08-11 ENCOUNTER — OFFICE VISIT (OUTPATIENT)
Facility: LOCATION | Age: 73
End: 2023-08-11
Payer: MEDICARE

## 2023-08-11 DIAGNOSIS — H61.23 BILATERAL IMPACTED CERUMEN: Primary | ICD-10-CM

## 2023-08-11 PROCEDURE — 99214 OFFICE O/P EST MOD 30 MIN: CPT | Performed by: OTOLARYNGOLOGY

## 2023-08-11 PROCEDURE — 92504 EAR MICROSCOPY EXAMINATION: CPT | Performed by: OTOLARYNGOLOGY

## 2023-08-11 NOTE — PROGRESS NOTES
Hoda Casiano is a 74year old female. No chief complaint on file. HPI:   1year-old female gets frequent wax impaction when she gets very impacted she gets vertigo was noticing this of late. No otorrhea otalgia. Current Outpatient Medications   Medication Sig Dispense Refill    LOSARTAN POTASSIUM-HCTZ 100-12.5 MG Oral Tab TAKE 1/2 TABLET EVERY DAY 45 tablet 3    rosuvastatin 5 MG Oral Tab Take 1 tablet (5 mg total) by mouth nightly. (Patient taking differently: Take 1 tablet (5 mg total) by mouth twice a week.) 30 tablet 3    Ibandronate Sodium 150 MG Oral Tab Take 1 tablet (150 mg total) by mouth every 30 (thirty) days. 3 tablet 4    Calcium Carb-Cholecalciferol (CALCIUM 1000 + D OR) Take 600 mg by mouth 3 (three) times daily as needed. acetaminophen 500 MG Oral Tab Take 2 tablets (1,000 mg total) by mouth one time. Takes 1,000mg in the morning      Multiple Vitamin (TAB-A-GENA) Oral Tab Take 1 tablet by mouth daily. BIOTIN OR Take 5,000 mcg by mouth daily. Cholecalciferol (VITAMIN D OR) Take 1 capsule by mouth daily. 2000 mg daily  30 tablet 0      Past Medical History:   Diagnosis Date    Arthritis     bilateral hips    Colon polyp     High blood pressure     High cholesterol     HYPERTENSION     Osteoarthritis     hips    OSTEOPENIA 10/23/08    DEXA Lspine Tscore-1.6, Hip Tscore-1.1, fem neck Tscore -2.3    Other and unspecified hyperlipidemia     Pain in joints     Visual impairment     contact lenses and glasses    Wears glasses       Social History:  Social History     Socioeconomic History    Marital status:    Tobacco Use    Smoking status: Never    Smokeless tobacco: Never   Vaping Use    Vaping Use: Never used   Substance and Sexual Activity    Alcohol use:  Yes     Alcohol/week: 3.3 standard drinks of alcohol     Types: 4 Glasses of wine per week    Drug use: No    Sexual activity: Yes   Other Topics Concern    Caffeine Concern Yes    Exercise Yes    Seat Belt Yes      Past Surgical History:   Procedure Laterality Date    BACK SURGERY  2/5/15    C3-C7 ACDF     BENIGN BIOPSY LEFT      BENIGN BIOPSY RIGHT      COLONOSCOPY  2009    Dr Jazzy Morataya due 5 years    COLONOSCOPY      CYST ASPIRATION LEFT      2003    DRAIN/INJECT LARGE JOINT/BURSA  1/13/2011    Performed by Evelin Bales at [address] [place] [address]  1/13/2011    Performed by Evelin Bales at [facility]      right     LEILA LOCALIZATION WIRE 1 SITE LEFT (CPT=19281)  1997    benign/another Bx done not sure when? also benign    LEILA LOCALIZATION WIRE 1 SITE RIGHT (CPT=19281)      1997 bn    NEEDLE BIOPSY LEFT  09/2009    externally done at [address] [place] [address]      Bilateral eyelids    OTHER SURGICAL HISTORY      multiple breast biopsy    SPINE SURGERY PROCEDURE UNLISTED      TOTAL HIP REPLACEMENT Left 10/2015    TOTAL HIP REPLACEMENT Right 12/03/2015    UPPER GI ENDOSCOPY,EXAM  1/2013    colonoscopy         REVIEW OF SYSTEMS:   GENERAL HEALTH: feels well otherwise  GENERAL : denies fever, chills, sweats, weight loss, weight gain  SKIN: denies any unusual skin lesions or rashes  RESPIRATORY: denies shortness of breath with exertion  NEURO: denies headaches    EXAM:   There were no vitals taken for this visit. System Pertinent findings Details   Constitutional  Overall appearance - Normal.   Head/Face  Facial features -- Normal. Skull - Normal.   Eyes  Pupils equal ,round ,react to light and accomidate   Ears  External Ear Right: Normal, Left: Normal. Canal - Right: Normal, Left: Normal. TM - Right: Wax removed TM normal left: Wax removed TM normal   Nose  External Nose, Normal, Septum -midline,Nasal Vault, clear.  Turbinates - Right: Normal left: Normal   Mouth/Throat  Lips/teeth/gums - Normal. Tonsils -1+ oropharynx - Normal.   Neck Exam  Inspection - Normal. Palpation - Normal. Parotid gland - Normal. Thyroid gland -normal   Lymph Detail  Submental. Submandibular. Anterior cervical. Posterior cervical. Supraclavicular. Patients exam showed wax impaction right ear, wax impaction left ear. Ear wax was removed under the operative microscope. No complications. Patient tolerated the procedure well. Ear exam findings listed in note after removal.    ASSESSMENT AND PLAN:   1. Bilateral impacted cerumen  Follow-up as needed      The patient indicates understanding of these issues and agrees to the plan.       Akash Resendez MD  8/11/2023  11:49 AM

## 2023-08-14 ENCOUNTER — TELEPHONE (OUTPATIENT)
Dept: FAMILY MEDICINE CLINIC | Facility: CLINIC | Age: 73
End: 2023-08-14

## 2023-08-14 NOTE — TELEPHONE ENCOUNTER
Pt is planning to get the flu shot this fall and wants to know if they should get another booster for Cogvid and if so how long after the flu shot should they wait    Also do they need to get an RSV shot to prevent it

## 2023-08-15 NOTE — TELEPHONE ENCOUNTER
Called and talked to nimisha told her she can get both flu and covid booster at the same time. The RSV is too new and they should wait between these immunizations.

## 2023-08-28 ENCOUNTER — PATIENT MESSAGE (OUTPATIENT)
Dept: FAMILY MEDICINE CLINIC | Facility: CLINIC | Age: 73
End: 2023-08-28

## 2023-08-28 NOTE — TELEPHONE ENCOUNTER
From: Miranda Latif  To: Myla Byers MD  Sent: 8/28/2023 2:55 PM CDT  Subject: Kimberli Montague, Dr. Darren Menjivar and I want to know which shots we should get. RSV, Flu, and the new COVID shot? Do we get all 3 at the same time?   Thank you, Shantelle House and Taya Santiago            Get time

## 2023-09-22 ENCOUNTER — OFFICE VISIT (OUTPATIENT)
Dept: FAMILY MEDICINE CLINIC | Facility: CLINIC | Age: 73
End: 2023-09-22
Payer: MEDICARE

## 2023-09-22 VITALS
HEIGHT: 56 IN | SYSTOLIC BLOOD PRESSURE: 132 MMHG | BODY MASS INDEX: 28.57 KG/M2 | WEIGHT: 127 LBS | DIASTOLIC BLOOD PRESSURE: 64 MMHG | HEART RATE: 104 BPM | RESPIRATION RATE: 20 BRPM

## 2023-09-22 DIAGNOSIS — R23.3 PETECHIAL RASH: ICD-10-CM

## 2023-09-22 DIAGNOSIS — M25.472 LEFT ANKLE SWELLING: Primary | ICD-10-CM

## 2023-09-22 DIAGNOSIS — U07.1 COVID-19 VIRUS INFECTION: ICD-10-CM

## 2023-09-22 PROCEDURE — 99214 OFFICE O/P EST MOD 30 MIN: CPT | Performed by: FAMILY MEDICINE

## 2023-09-22 RX ORDER — AMOXICILLIN AND CLAVULANATE POTASSIUM 875; 125 MG/1; MG/1
1 TABLET, FILM COATED ORAL EVERY 12 HOURS
COMMUNITY
Start: 2023-09-19 | End: 2023-09-29

## 2023-09-22 RX ORDER — MELOXICAM 15 MG/1
15 TABLET ORAL DAILY
COMMUNITY
Start: 2023-07-24

## 2023-09-22 RX ORDER — CALCIUM CARBONATE/VITAMIN D3 500-10/5ML
LIQUID (ML) ORAL
COMMUNITY

## 2023-11-07 ENCOUNTER — LAB ENCOUNTER (OUTPATIENT)
Dept: LAB | Age: 73
End: 2023-11-07
Attending: FAMILY MEDICINE
Payer: MEDICARE

## 2023-11-07 DIAGNOSIS — R73.9 HYPERGLYCEMIA: ICD-10-CM

## 2023-11-07 DIAGNOSIS — E78.00 PURE HYPERCHOLESTEROLEMIA: ICD-10-CM

## 2023-11-07 DIAGNOSIS — D69.2 AUTOERYTHROCYTE SENSITIVITY DISORDER (HCC): ICD-10-CM

## 2023-11-07 DIAGNOSIS — Z00.00 LABORATORY EXAMINATION ORDERED AS PART OF A ROUTINE GENERAL MEDICAL EXAMINATION: ICD-10-CM

## 2023-11-07 LAB
ALBUMIN SERPL-MCNC: 4 G/DL (ref 3.4–5)
ALBUMIN/GLOB SERPL: 1.2 {RATIO} (ref 1–2)
ALP LIVER SERPL-CCNC: 61 U/L
ALT SERPL-CCNC: 41 U/L
ANION GAP SERPL CALC-SCNC: 6 MMOL/L (ref 0–18)
AST SERPL-CCNC: 31 U/L (ref 15–37)
BILIRUB SERPL-MCNC: 0.7 MG/DL (ref 0.1–2)
BUN BLD-MCNC: 14 MG/DL (ref 9–23)
CALCIUM BLD-MCNC: 9.9 MG/DL (ref 8.5–10.1)
CHLORIDE SERPL-SCNC: 105 MMOL/L (ref 98–112)
CHOLEST SERPL-MCNC: 216 MG/DL (ref ?–200)
CO2 SERPL-SCNC: 26 MMOL/L (ref 21–32)
CREAT BLD-MCNC: 0.74 MG/DL
EGFRCR SERPLBLD CKD-EPI 2021: 85 ML/MIN/1.73M2 (ref 60–?)
ERYTHROCYTE [DISTWIDTH] IN BLOOD BY AUTOMATED COUNT: 12.7 %
EST. AVERAGE GLUCOSE BLD GHB EST-MCNC: 120 MG/DL (ref 68–126)
FASTING PATIENT LIPID ANSWER: YES
FASTING STATUS PATIENT QL REPORTED: YES
GLOBULIN PLAS-MCNC: 3.4 G/DL (ref 2.8–4.4)
GLUCOSE BLD-MCNC: 100 MG/DL (ref 70–99)
HBA1C MFR BLD: 5.8 % (ref ?–5.7)
HCT VFR BLD AUTO: 42.9 %
HDLC SERPL-MCNC: 85 MG/DL (ref 40–59)
HGB BLD-MCNC: 14.2 G/DL
LDLC SERPL CALC-MCNC: 115 MG/DL (ref ?–100)
MCH RBC QN AUTO: 30.9 PG (ref 26–34)
MCHC RBC AUTO-ENTMCNC: 33.1 G/DL (ref 31–37)
MCV RBC AUTO: 93.3 FL
NONHDLC SERPL-MCNC: 131 MG/DL (ref ?–130)
OSMOLALITY SERPL CALC.SUM OF ELEC: 285 MOSM/KG (ref 275–295)
PLATELET # BLD AUTO: 362 10(3)UL (ref 150–450)
POTASSIUM SERPL-SCNC: 3.9 MMOL/L (ref 3.5–5.1)
PROT SERPL-MCNC: 7.4 G/DL (ref 6.4–8.2)
RBC # BLD AUTO: 4.6 X10(6)UL
SODIUM SERPL-SCNC: 137 MMOL/L (ref 136–145)
TRIGL SERPL-MCNC: 94 MG/DL (ref 30–149)
TSI SER-ACNC: 1.29 MIU/ML (ref 0.36–3.74)
VLDLC SERPL CALC-MCNC: 16 MG/DL (ref 0–30)
WBC # BLD AUTO: 7.3 X10(3) UL (ref 4–11)

## 2023-11-07 PROCEDURE — 36415 COLL VENOUS BLD VENIPUNCTURE: CPT

## 2023-11-07 PROCEDURE — 80053 COMPREHEN METABOLIC PANEL: CPT

## 2023-11-07 PROCEDURE — 84443 ASSAY THYROID STIM HORMONE: CPT

## 2023-11-07 PROCEDURE — 85027 COMPLETE CBC AUTOMATED: CPT

## 2023-11-07 PROCEDURE — 83036 HEMOGLOBIN GLYCOSYLATED A1C: CPT

## 2023-11-07 PROCEDURE — 80061 LIPID PANEL: CPT

## 2023-11-14 NOTE — ASSESSMENT & PLAN NOTE
BP shows good control with last BP of 132/64. Continue lifestyle changes, diet, exercise and weight loss. Last K was 3.9 done on 11/7/2023. Last Cr was 0.74 done on 11/7/2023. Hypertension meds include LOSARTAN POTASSIUM-HCTZ 100-12.5 MG Oral Tab [[id number]].

## 2023-11-14 NOTE — ASSESSMENT & PLAN NOTE
Cholesterol shows Good control. Long term heart-healthy diet and lifestyle discussed and encouraged to reduce risk of cardiovascular disease. Cholesterol: 216, done on 11/7/2023. HDL Cholesterol: 85, done on 11/7/2023. TriGlycerides 94, done on 11/7/2023. LDL Cholesterol: 115, done on 11/7/2023. Cholesterol medications include rosuvastatin 5 MG Oral Tab [[id number]].

## 2023-11-15 ENCOUNTER — OFFICE VISIT (OUTPATIENT)
Dept: FAMILY MEDICINE CLINIC | Facility: CLINIC | Age: 73
End: 2023-11-15
Payer: MEDICARE

## 2023-11-15 VITALS
HEIGHT: 56 IN | DIASTOLIC BLOOD PRESSURE: 80 MMHG | SYSTOLIC BLOOD PRESSURE: 128 MMHG | BODY MASS INDEX: 28.34 KG/M2 | HEART RATE: 78 BPM | WEIGHT: 126 LBS | RESPIRATION RATE: 14 BRPM

## 2023-11-15 DIAGNOSIS — Z00.00 ENCOUNTER FOR ANNUAL HEALTH EXAMINATION: ICD-10-CM

## 2023-11-15 DIAGNOSIS — I51.5 CARDIAC CALCIFICATION (HCC): ICD-10-CM

## 2023-11-15 DIAGNOSIS — M81.0 AGE-RELATED OSTEOPOROSIS WITHOUT CURRENT PATHOLOGICAL FRACTURE: ICD-10-CM

## 2023-11-15 DIAGNOSIS — Z00.00 ANNUAL PHYSICAL EXAM: Primary | ICD-10-CM

## 2023-11-15 DIAGNOSIS — E78.00 PURE HYPERCHOLESTEROLEMIA: ICD-10-CM

## 2023-11-15 DIAGNOSIS — I10 ESSENTIAL HYPERTENSION: ICD-10-CM

## 2023-11-15 DIAGNOSIS — F33.42 RECURRENT MAJOR DEPRESSIVE DISORDER, IN FULL REMISSION (HCC): ICD-10-CM

## 2023-11-15 DIAGNOSIS — I70.0 THORACIC AORTA ATHEROSCLEROSIS (HCC): ICD-10-CM

## 2023-12-05 ENCOUNTER — TELEPHONE (OUTPATIENT)
Dept: FAMILY MEDICINE CLINIC | Facility: CLINIC | Age: 73
End: 2023-12-05

## 2023-12-05 NOTE — TELEPHONE ENCOUNTER
NO- cologuard cannot do unless no polyp hx, no cancer hx, no bleeding hx.  We usually do one in the mid 70's

## 2023-12-05 NOTE — TELEPHONE ENCOUNTER
Called and talked to patient and told her she could not do cologard as her last colonoscopy was abnormal. She will call to schedule this.

## 2023-12-05 NOTE — TELEPHONE ENCOUNTER
Pt call because have a nurse question , patient receive a message from [facility] requesting a colonoscopy appt , but patient want to know because she is 74years old if she can get Cologuard test.

## 2024-01-05 ENCOUNTER — TELEPHONE (OUTPATIENT)
Dept: FAMILY MEDICINE CLINIC | Facility: CLINIC | Age: 74
End: 2024-01-05

## 2024-01-05 DIAGNOSIS — E78.00 PURE HYPERCHOLESTEROLEMIA: Primary | ICD-10-CM

## 2024-01-05 RX ORDER — ROSUVASTATIN CALCIUM 5 MG/1
5 TABLET, COATED ORAL NIGHTLY
Qty: 90 TABLET | Refills: 3 | Status: SHIPPED | OUTPATIENT
Start: 2024-01-05

## 2024-01-05 NOTE — TELEPHONE ENCOUNTER
Pt call med refill      rosuvastatin 5 MG Oral Tab         [id number] - [place] - [address] [phone], [phone] [address] [place] [address] Phone: [phone] Fax: [phone] Hours: Not open 24 hours

## 2024-01-23 ENCOUNTER — OFFICE VISIT (OUTPATIENT)
Facility: LOCATION | Age: 74
End: 2024-01-23
Payer: MEDICARE

## 2024-01-23 DIAGNOSIS — H61.23 BILATERAL IMPACTED CERUMEN: Primary | ICD-10-CM

## 2024-01-23 PROCEDURE — 99213 OFFICE O/P EST LOW 20 MIN: CPT | Performed by: OTOLARYNGOLOGY

## 2024-01-23 NOTE — PROGRESS NOTES
Lori Garcia is a 73 year old female. No chief complaint on file.    HPI:   Planes of plugging both ears.  Denies fevers chills nausea or vomiting.  Current Outpatient Medications   Medication Sig Dispense Refill    rosuvastatin 5 MG Oral Tab Take 1 tablet (5 mg total) by mouth nightly. 90 tablet 3    PEG 3350-KCl-Na Bicarb-NaCl 420 g Oral Recon Soln Take as directed by physician 4000 mL 0    Meloxicam 15 MG Oral Tab Take 1 tablet (15 mg total) by mouth daily.      Zinc 30 MG Oral Cap Take by mouth.      LOSARTAN POTASSIUM-HCTZ 100-12.5 MG Oral Tab TAKE 1/2 TABLET EVERY DAY 45 tablet 3    Calcium Carb-Cholecalciferol (CALCIUM 1000 + D OR) Take 600 mg by mouth 3 (three) times daily as needed.      acetaminophen 500 MG Oral Tab Take 2 tablets (1,000 mg total) by mouth one time. Takes 1,000mg in the morning      Multiple Vitamin (TAB-A-GENA) Oral Tab Take 1 tablet by mouth daily.      Cholecalciferol (VITAMIN D OR) Take 1 capsule by mouth daily. 2000 mg daily  30 tablet 0      Past Medical History:   Diagnosis Date    Arthritis     bilateral hips    Colon polyp     High blood pressure     High cholesterol     HYPERTENSION     Osteoarthritis     hips    OSTEOPENIA 10/23/08    DEXA Lspine Tscore-1.6, Hip Tscore-1.1, fem neck Tscore -2.3    Other and unspecified hyperlipidemia     Pain in joints     Visual impairment     contact lenses and glasses    Wears glasses       Social History:  Social History     Socioeconomic History    Marital status:    Tobacco Use    Smoking status: Never    Smokeless tobacco: Never   Vaping Use    Vaping Use: Never used   Substance and Sexual Activity    Alcohol use: Yes     Alcohol/week: 3.3 standard drinks of alcohol     Types: 4 Glasses of wine per week    Drug use: No    Sexual activity: Yes   Other Topics Concern    Caffeine Concern Yes    Exercise Yes    Seat Belt Yes      Past Surgical History:   Procedure Laterality Date    BACK SURGERY  2/5/15    C3-C7 ACDF     BENIGN BIOPSY  LEFT      BENIGN BIOPSY RIGHT      COLONOSCOPY  2009    Dr Pedraza due 5 years    COLONOSCOPY      CYST ASPIRATION LEFT      2003    DRAIN/INJECT LARGE JOINT/BURSA  1/13/2011    Performed by JONNY CADET at [facility]    FLUOROSCOPIC GUIDANCE NEEDLE PLACEMENT  1/13/2011    Performed by JONNY CADET at [facility]    HIP REPLACEMENT SURGERY      KNEE REPLACEMENT SURGERY      right     LEILA LOCALIZATION WIRE 1 SITE LEFT (CPT=19281)  1997    benign/another Bx done not sure when? also benign    LEILA LOCALIZATION WIRE 1 SITE RIGHT (CPT=19281)      1997 bn    NEEDLE BIOPSY LEFT  09/2009    externally done at [facility]    OTHER SURGICAL HISTORY      Bilateral eyelids    OTHER SURGICAL HISTORY      multiple breast biopsy    SPINE SURGERY PROCEDURE UNLISTED      TOTAL HIP REPLACEMENT Left 10/2015    TOTAL HIP REPLACEMENT Right 12/03/2015    UPPER GI ENDOSCOPY,EXAM  1/2013    colonoscopy         REVIEW OF SYSTEMS:   GENERAL HEALTH: feels well otherwise  GENERAL : denies fever, chills, sweats, weight loss, weight gain  SKIN: denies any unusual skin lesions or rashes  RESPIRATORY: denies shortness of breath with exertion  NEURO: denies headaches    EXAM:   There were no vitals taken for this visit.  System Findings Details   Skin Normal Inspection - Normal.   Constitutional Normal Overall appearance - Normal.   Head/Face Normal Facial features - Normal. Eyebrows - Normal. Skull - Normal.   Oral/Oropharynx Normal Lips - Normal, Tonsils - Normal, Tongue - Normal    Nasal Normal External nose - Normal. Nasal septum - Normal, Turbinates - Normal   Neurological Normal Memory - Normal. Cranial nerves - Cranial nerves II through XII grossly intact.   Neck Exam Normal Inspection - Normal. Palpation - Normal. Parotid gland - Normal. Thyroid gland - Normal.   Psychiatric Normal Orientation - Oriented to time, place, person & situation. Appropriate mood and affect.   Lymph Detail Normal Submental. Submandibular.  Anterior cervical. Posterior cervical. Supraclavicular.   Eyes Normal Conjunctiva - Right: Normal, Left: Normal. Pupil - Right: Normal, Left: Normal.    Ears Normal Inspection - Right: Normal, Left: Normal. Canal - Left: Normal. TM - Right: Normal, Left: Normal.     Patients exam showed wax impaction right ear, wax impaction left ear. Ear wax was removed under the operative microscope. No complications. Patient tolerated the procedure well. Ear exam findings listed in note after removal.    ASSESSMENT AND PLAN:   1. Bilateral impacted cerumen  Problem is resolved, will return in 6 months for recheck.      The patient indicates understanding of these issues and agrees to the plan.      Sid Wells MD  1/23/2024  2:16 PM

## 2024-02-02 ENCOUNTER — TELEPHONE (OUTPATIENT)
Dept: FAMILY MEDICINE CLINIC | Facility: CLINIC | Age: 74
End: 2024-02-02

## 2024-02-02 DIAGNOSIS — I10 ESSENTIAL HYPERTENSION: ICD-10-CM

## 2024-02-02 RX ORDER — LOSARTAN POTASSIUM AND HYDROCHLOROTHIAZIDE 12.5; 1 MG/1; MG/1
0.5 TABLET ORAL DAILY
Qty: 45 TABLET | Refills: 3 | Status: SHIPPED | OUTPATIENT
Start: 2024-02-02

## 2024-02-02 NOTE — TELEPHONE ENCOUNTER
Pt would like a 3 month supply of her  LOSARTAN POTASSIUM-HCTZ 100-12.5 MG Oral Tab       Meloxicam 15 MG Oral Tab     [id number] - [place], [place] - [facility] [address] [phone], [phone] [[id number]]

## 2024-03-01 ENCOUNTER — TELEPHONE (OUTPATIENT)
Dept: FAMILY MEDICINE CLINIC | Facility: CLINIC | Age: 74
End: 2024-03-01

## 2024-03-01 RX ORDER — MELOXICAM 15 MG/1
15 TABLET ORAL DAILY
Qty: 90 TABLET | Refills: 3 | Status: SHIPPED | OUTPATIENT
Start: 2024-03-01

## 2024-04-05 PROBLEM — K29.30 CHRONIC SUPERFICIAL GASTRITIS WITHOUT BLEEDING: Status: ACTIVE | Noted: 2024-04-05

## 2024-04-26 ENCOUNTER — TELEPHONE (OUTPATIENT)
Dept: FAMILY MEDICINE CLINIC | Facility: CLINIC | Age: 74
End: 2024-04-26

## 2024-04-26 DIAGNOSIS — M51.36 DDD (DEGENERATIVE DISC DISEASE), LUMBAR: Primary | ICD-10-CM

## 2024-04-26 RX ORDER — ACETAMINOPHEN AND CODEINE PHOSPHATE 300; 30 MG/1; MG/1
1 TABLET ORAL EVERY 4 HOURS PRN
Qty: 20 TABLET | Refills: 0 | Status: SHIPPED | OUTPATIENT
Start: 2024-04-26

## 2024-04-26 RX ORDER — PREDNISONE 10 MG/1
TABLET ORAL
Qty: 35 TABLET | Refills: 0 | Status: SHIPPED | OUTPATIENT
Start: 2024-04-26 | End: 2024-05-16

## 2024-04-26 NOTE — TELEPHONE ENCOUNTER
Pt was seen by Dr. Summers on 4/10/24 and she is so much pain and has a trip next week and \"she does not know how she will be able to stand it\"  Dr. Summers gave her a muscle relaxant and \"it is not helping at all\" she is asking for something for the pain to be sent to [facility]. Please call.

## 2024-04-26 NOTE — TELEPHONE ENCOUNTER
Lori says the baclofen is not helping with the R knee pain. She will be traveling a week from tomorrow and is concerned. She will be seeing a new ortho on 5/14/24 when she returns from her trip. Advised her that  order tapering dosing of Prednisone along with acetaminophen with codeine for pain. I spoke with . He said she can continue the baclofen as needed. Suggested she speak with her pharmacist with any prednisone questions. Lori verbalized understanding.

## 2024-04-26 NOTE — TELEPHONE ENCOUNTER
Please notify:    Requested Prescriptions     Signed Prescriptions Disp Refills    predniSONE 10 MG Oral Tab 35 tablet 0     Sig: Take 1 tablet (10 mg total) by mouth 3 (three) times daily for 5 days, THEN 1 tablet (10 mg total) 2 (two) times daily for 5 days, THEN 1 tablet (10 mg total) daily for 5 days, THEN 0.5 tablets (5 mg total) daily for 5 days.     Authorizing Provider: ROBERTO CARLOS GALINDO    acetaminophen-codeine 300-30 MG Oral Tab 20 tablet 0     Sig: Take 1 tablet by mouth every 4 (four) hours as needed for Pain.     Authorizing Provider: ROBERTO CARLOS GALINDO        Sent to:      [id number] - [place], [place] - [address] [phone], [phone]  [facility] [address]  Phone: [phone] Fax: [phone]    [address] [phone], [phone]  [address] [place] [address]  Phone: [phone] Fax: [phone]    [facility] - [facility] [address] [facility] [phone], [phone] [address] [facility] [address]  Phone: [phone] Fax: [phone]    [facility] [address] [place] [address] [facility] [phone], [phone]  [address] [facility] [address]  Phone: [phone] Fax: [phone]

## 2024-05-20 ENCOUNTER — TELEPHONE (OUTPATIENT)
Dept: FAMILY MEDICINE CLINIC | Facility: CLINIC | Age: 74
End: 2024-05-20

## 2024-05-20 DIAGNOSIS — Z12.31 SCREENING MAMMOGRAM FOR BREAST CANCER: Primary | ICD-10-CM

## 2024-05-20 NOTE — TELEPHONE ENCOUNTER
Future Appointments     Date Time Provider Department Center   7/1/2024  1:00 PM Fabrice Castañeda MD [facility] [place]   11/18/2024 12:00 PM Johan Summers MD [facility]        Order entered for screening mammogram per protocol.

## 2024-05-24 DIAGNOSIS — M51.36 DDD (DEGENERATIVE DISC DISEASE), LUMBAR: ICD-10-CM

## 2024-05-24 RX ORDER — ACETAMINOPHEN AND CODEINE PHOSPHATE 300; 30 MG/1; MG/1
1 TABLET ORAL EVERY 4 HOURS PRN
Qty: 20 TABLET | Refills: 0 | Status: SHIPPED | OUTPATIENT
Start: 2024-05-24

## 2024-05-24 NOTE — TELEPHONE ENCOUNTER
Requested Prescriptions     Pending Prescriptions Disp Refills    ACETAMINOPHEN-CODEINE 300-30 MG Oral Tab [Pharmacy Med Name: Acetaminophen/Codeine 300-30 Mg Tab [facility]] 20 tablet 0     Sig: TAKE ONE TABLET BY MOUTH EVERY FOUR HOURS AS NEEDED FOR PAIN     LOV 4/10/2024     Patient was asked to follow-up in: 7 months     Appointment scheduled: 11/18/2024 Johan Summers MD     Medication failed protocol.    Routed to Dr. Kristopher MD

## 2024-06-12 ENCOUNTER — TELEPHONE (OUTPATIENT)
Dept: FAMILY MEDICINE CLINIC | Facility: CLINIC | Age: 74
End: 2024-06-12

## 2024-06-12 NOTE — TELEPHONE ENCOUNTER
Pt is calling she is having her whole left leg swelling, she saw an orthopedic and x ray, it is getting worse. She has tried compression socks and everything. Pt is out of town.

## 2024-06-12 NOTE — TELEPHONE ENCOUNTER
Pt state having L leg swelling, slight discomfort , redness- ongoing issue but worse since being in [place]. Pt states no improvement with compression stocking and elevation. Has had L knee issues - seen ortho in April- x ray was normal.  Advised pt to go to local Immediate Care for evaluation , Pt declined and just wants appt with Dr. Summers next week  Pt seemed to down play symptoms- appt given for 6/19/24- advised if symptoms worsen to go to ER. Pt verbalized understanding

## 2024-06-19 ENCOUNTER — TELEPHONE (OUTPATIENT)
Dept: FAMILY MEDICINE CLINIC | Facility: CLINIC | Age: 74
End: 2024-06-19

## 2024-06-19 NOTE — TELEPHONE ENCOUNTER
Pt states was in hospital in [place] Dx with DVT and other issues.  Pt being discharged today and flying home tomorrow.  Pt would like to follow up with Dr. Summers- no appts- please advise

## 2024-06-19 NOTE — TELEPHONE ENCOUNTER
Pt is calling because she is being discharged from the hospital in [place] and coming home tomorrow and wants to be seen on Friday at any time. Doesn't want to see anyone else

## 2024-06-20 NOTE — TELEPHONE ENCOUNTER
Patient called she would like to come in tomorrow  instead of 06-26-24 can someone call her back   See notes below

## 2024-06-20 NOTE — TELEPHONE ENCOUNTER
Called and talked to patient very anxious about DVT and external bruise she cannot take the percocet it makes her made appointment for Saturday with Dr Amin to follow up

## 2024-06-22 ENCOUNTER — OFFICE VISIT (OUTPATIENT)
Dept: FAMILY MEDICINE CLINIC | Facility: CLINIC | Age: 74
End: 2024-06-22

## 2024-06-22 VITALS
HEART RATE: 100 BPM | RESPIRATION RATE: 18 BRPM | SYSTOLIC BLOOD PRESSURE: 112 MMHG | DIASTOLIC BLOOD PRESSURE: 64 MMHG | WEIGHT: 121.38 LBS | BODY MASS INDEX: 27 KG/M2

## 2024-06-22 DIAGNOSIS — I82.4Y2 DVT, LOWER EXTREMITY, PROXIMAL, ACUTE, LEFT (HCC): Primary | ICD-10-CM

## 2024-06-22 DIAGNOSIS — Z79.01 ANTICOAGULATED: ICD-10-CM

## 2024-06-22 DIAGNOSIS — S30.1XXA HEMATOMA OF GROIN, INITIAL ENCOUNTER: ICD-10-CM

## 2024-06-22 PROCEDURE — 99214 OFFICE O/P EST MOD 30 MIN: CPT | Performed by: FAMILY MEDICINE

## 2024-06-22 RX ORDER — APIXABAN 5 MG (74)
1 KIT ORAL AS DIRECTED
COMMUNITY
Start: 2024-06-17

## 2024-06-22 RX ORDER — SERTRALINE HYDROCHLORIDE 25 MG/1
25 TABLET, FILM COATED ORAL DAILY
Qty: 30 TABLET | Refills: 0 | Status: SHIPPED | OUTPATIENT
Start: 2024-06-22 | End: 2024-06-22

## 2024-06-24 NOTE — PROGRESS NOTES
Lori Garcia is a 73 year old female.     HPI:   Patient presents for a hospital follow up.  She was visiting [place]. She was hospitalized at [facility] 6/13/24-6/17/24 with DVT left leg. Pt noticed significant left leg swelling after she flew from [place] to [place].  US showed extensive DVT   Dr. Sai Burris, interventional cardiology, performed a thrombectomy and common iliac and femoral vein stenting 6/14/24.  Patient developed severe left hip pain following the procedure.  CT abdomen was done that showed a left retroperitoneal hematoma.  There was concern for a possible mass versus hematoma.  Therefore, evaluated by ortho surg, vascular surg, Internal med, IR.  Vascular surgery had suggested possible drainage of hematoma by interventional radiology.  IR was concerned about infection risk and did not recommend drainage.  She was discharged on Eliquis 10 mg twice daily for 7 days then 5 mg twice daily until told otherwise.    She was discharged from [facility] and admitted to [facility] [place] on 6/18/2024 due to severe pain of the left lower extremity.  She had a lower extremity ultrasound done that showed a left gastrocnemius DVT.  CT showed left retroperitoneal hematoma with possible mass effect on the stent likely causing her pain.  But there was no new bleeding.  IR recommended outpatient follow-up once her hematoma resolves She was discharged home on Eliquis 5 mg twice daily and told she would needed to take for at least 3 months.  She was given clearance to fly home.    Here with  today.  Overall, feeling better.  Pain is manageable.  She is not requiring any pain medication. She has an appt with Dr. Summers on 6/26/24.     Patient Active Problem List   Diagnosis    Pure hypercholesterolemia    Essential hypertension    Status post total replacement of both hips    History of colon polyps    Age-related osteoporosis without current pathological fracture    DDD (degenerative disc  disease), lumbar    Spinal stenosis of lumbar region    Lumbar radiculitis    Family history of malignant neoplasm of digestive organ    Chronic knee pain after total replacement of right knee joint    Recurrent major depressive disorder, in full remission (HCC)    Cardiac calcification (HCC)    Thoracic aorta atherosclerosis (HCC)    Autoerythrocyte sensitivity disorder (HCC)    Chronic superficial gastritis without bleeding       Wt Readings from Last 6 Encounters:   06/22/24 121 lb 6.4 oz (55.1 kg)   04/10/24 125 lb 9.6 oz (57 kg)   03/06/24 125 lb (56.7 kg)   11/15/23 126 lb (57.2 kg)   09/22/23 127 lb (57.6 kg)   06/21/23 126 lb (57.2 kg)     Body mass index is 27.22 kg/m².    REVIEW OF SYSTEMS:   GENERAL HEALTH: feels well otherwise  RESPIRATORY: denies shortness of breath  CARDIOVASCULAR: denies chest pain  GI: denies abdominal pain  NEURO: denies headaches  PSYCH: mood is good    EXAM:   /64   Pulse 100   Resp 18   Wt 121 lb 6.4 oz (55.1 kg)   BMI 27.22 kg/m²   GENERAL: well developed, well nourished,in no apparent distress  LUNGS: no respiratory distress, CTA  HEART:  RRR  EXT:  left foot/ankle swelling  PSYCH:  Alert, mood appropriate.  Does not appear anxious or depressed    ASSESSMENT AND PLAN:   1. DVT, lower extremity, proximal, acute, left (HCC)  - Cardio Referral - Internal    2. Anticoagulated    3. Hematoma of groin, initial encounter  - CBC, Platelet; No Differential; Future  - Comp Metabolic Panel (14); Future    Pt will see Dr. Summers on Wednesday and would like to review events with him.  She was instructed to f/u with cards asap and would like to see Dr. Lora.   She is to continue on Eliquis as directed.  Complete labs prior to appt Wednesday.     Orders Placed This Encounter   Procedures    CBC, Platelet; No Differential    Comp Metabolic Panel (14)     Meds & Refills for this Visit:  Requested Prescriptions      No prescriptions requested or ordered in this encounter     Imaging  & Consults:  CARDIO - INTERNAL

## 2024-06-25 ENCOUNTER — LAB ENCOUNTER (OUTPATIENT)
Dept: LAB | Age: 74
End: 2024-06-25
Attending: FAMILY MEDICINE

## 2024-06-25 DIAGNOSIS — S30.1XXA HEMATOMA OF GROIN, INITIAL ENCOUNTER: ICD-10-CM

## 2024-06-25 LAB
ALBUMIN SERPL-MCNC: 3.7 G/DL (ref 3.4–5)
ALBUMIN/GLOB SERPL: 1 {RATIO} (ref 1–2)
ALP LIVER SERPL-CCNC: 62 U/L
ALT SERPL-CCNC: 31 U/L
ANION GAP SERPL CALC-SCNC: 9 MMOL/L (ref 0–18)
AST SERPL-CCNC: 17 U/L (ref 15–37)
BILIRUB SERPL-MCNC: 0.7 MG/DL (ref 0.1–2)
BUN BLD-MCNC: 13 MG/DL (ref 9–23)
CALCIUM BLD-MCNC: 9.7 MG/DL (ref 8.5–10.1)
CHLORIDE SERPL-SCNC: 102 MMOL/L (ref 98–112)
CO2 SERPL-SCNC: 26 MMOL/L (ref 21–32)
CREAT BLD-MCNC: 0.57 MG/DL
EGFRCR SERPLBLD CKD-EPI 2021: 95 ML/MIN/1.73M2 (ref 60–?)
ERYTHROCYTE [DISTWIDTH] IN BLOOD BY AUTOMATED COUNT: 13.9 %
FASTING STATUS PATIENT QL REPORTED: YES
GLOBULIN PLAS-MCNC: 3.7 G/DL (ref 2.8–4.4)
GLUCOSE BLD-MCNC: 110 MG/DL (ref 70–99)
HCT VFR BLD AUTO: 32.5 %
HGB BLD-MCNC: 10.8 G/DL
MCH RBC QN AUTO: 31.9 PG (ref 26–34)
MCHC RBC AUTO-ENTMCNC: 33.2 G/DL (ref 31–37)
MCV RBC AUTO: 95.9 FL
OSMOLALITY SERPL CALC.SUM OF ELEC: 285 MOSM/KG (ref 275–295)
PLATELET # BLD AUTO: 520 10(3)UL (ref 150–450)
POTASSIUM SERPL-SCNC: 4 MMOL/L (ref 3.5–5.1)
PROT SERPL-MCNC: 7.4 G/DL (ref 6.4–8.2)
RBC # BLD AUTO: 3.39 X10(6)UL
SODIUM SERPL-SCNC: 137 MMOL/L (ref 136–145)
WBC # BLD AUTO: 8.3 X10(3) UL (ref 4–11)

## 2024-06-25 PROCEDURE — 36415 COLL VENOUS BLD VENIPUNCTURE: CPT

## 2024-06-25 PROCEDURE — 80053 COMPREHEN METABOLIC PANEL: CPT

## 2024-06-25 PROCEDURE — 85027 COMPLETE CBC AUTOMATED: CPT

## 2024-06-25 NOTE — PROGRESS NOTES
Subjective:   Lori Garcia is a 73 year old female who presents for hospital follow up.   She [place]   Admit Date: 6/12/2024  Discharge Date: 6/14  Hospital Discharge Diagnosis: DVT    Interactive contact within 2 business days post discharge first initiated on Date: NA    During the visit, the following was completed:  Obtained and reviewed discharge summary, continuity of care documents, and Hospitalist notes  Reviewed Labs (CBC, CMP)    HPI: ankle swelled, was in [place] and had swelling and they noticed DVT, 4 cm clot. 6 nihgts in 2 [place] pharmacies,   They removed clot and 2 stents.       History/Other:   Current Medications:  Medication Reconciliation:  I am aware of an inpatient discharge within the last 30 days.  The discharge medication list has been reconciled with the patient's current medication list and reviewed by me.  See medication list for additions of new medication, and changes to current doses of medications and discontinued medications.  Outpatient Medications Marked as Taking for the 6/26/24 encounter (Office Visit) with Johan Summers MD   Medication Sig    ELIQUIS DVT/PE STARTER PACK 5 MG Oral Tablet Therapy Pack Take 1 tablet by mouth As Directed.    ACETAMINOPHEN-CODEINE 300-30 MG Oral Tab TAKE ONE TABLET BY MOUTH EVERY FOUR HOURS AS NEEDED FOR PAIN    LORazepam 0.5 MG Oral Tab 1 to 2 tabs 1 hour before procedure. May repeat 1 tab every 20 minutes to a max 2 mg before procedure as needed for anxiety related to procedure.    Losartan Potassium-HCTZ 100-12.5 MG Oral Tab Take 0.5 tablets by mouth daily.    rosuvastatin 5 MG Oral Tab Take 1 tablet (5 mg total) by mouth nightly.    Zinc 30 MG Oral Cap Take by mouth.    Calcium Carb-Cholecalciferol (CALCIUM 1000 + D OR) Take 600 mg by mouth 3 (three) times daily as needed.    acetaminophen 500 MG Oral Tab Take 2 tablets (1,000 mg total) by mouth one time. Takes 1,000mg in the morning     Multiple Vitamin (TAB-A-GENA) Oral Tab Take 1 tablet by mouth daily.    Cholecalciferol (VITAMIN D OR) Take 1 capsule by mouth daily. 2000 mg daily        Review of Systems   Constitutional: Negative.  Negative for fatigue, fever and unexpected weight change.   HENT: Negative.     Eyes: Negative.    Respiratory: Negative.     Cardiovascular: Negative.    Gastrointestinal: Negative.  Negative for nausea and vomiting.   Endocrine: Negative.  Negative for polydipsia, polyphagia and polyuria.   Genitourinary: Negative.    Musculoskeletal: Negative.  Negative for neck pain.   Skin: Negative.    Neurological: Negative.    Psychiatric/Behavioral: Negative.     All other systems reviewed and are negative.        Objective:   Physical Exam  Vitals and nursing note reviewed.   Constitutional:       General: She is not in acute distress.     Appearance: Normal appearance.   HENT:      Head: Normocephalic.   Cardiovascular:      Rate and Rhythm: Normal rate and regular rhythm.      Pulses:           Posterior tibial pulses are 2+ on the right side and 2+ on the left side.      Heart sounds: Normal heart sounds. No murmur heard.  Pulmonary:      Effort: Pulmonary effort is normal. No respiratory distress.      Breath sounds: Normal breath sounds. No wheezing.   Abdominal:      General: Bowel sounds are normal.      Palpations: Abdomen is soft.      Tenderness: There is no abdominal tenderness.   Musculoskeletal:      Cervical back: Normal range of motion.      Right lower leg: No edema.      Left lower leg: No edema.   Skin:     General: Skin is warm and dry.      Findings: No rash.   Neurological:      Mental Status: She is alert and oriented to person, place, and time.   Psychiatric:         Mood and Affect: Mood normal.         Behavior: Behavior normal.         Thought Content: Thought content normal.         Judgment: Judgment normal.           /80   Pulse 78   Resp 14   Ht 4' 8\" (1.422 m)   Wt 122 lb 3.2 oz  (55.4 kg)   BMI 27.40 kg/m²  Estimated body mass index is 27.4 kg/m² as calculated from the following:    Height as of this encounter: 4' 8\" (1.422 m).    Weight as of this encounter: 122 lb 3.2 oz (55.4 kg).    Assessment & Plan:   1. DVT, lower extremity, proximal, acute, left (HCC) (Primary)  -     OP REFERRAL TO [facility] HEMATOLOGY/ONCOLOGY GROUP  2. Retroperitoneal hematoma  3. Stress reaction    I spent 15 minutes reviewing her records from outside sources before visit today from both admissions in [place], I spent 25 minutes face-to-face and another 3 minutes documenting afterwards for total of 43 minutes today.  Retroperitoneal hematoma.  In the hospital she was having a lot of pain and they were debating about it that he is ambulating quite well and no pain.  Hemoglobin has come up to 10.8 yesterday and it was 10.1 with the previous hospitalization so she is Deepa from a baseline of 14.1 and appears to be steady.  She had 2 stents in the vein on the left.  They did a percutaneous removal of the large blood clot.  She is on Eliquis and she is very anxious about the source of the clot.  At this point I am getting in her with hematology to better help us long-term but she looks excellent, I gave her some reassurance.  She is still not normal but she can start being a little bit more active including going to a friend's for Fourth of July next week.  Wear compression stockings, continue the Eliquis and we discussed the bruising.  Good range of motion in the left hip where the hematoma was physically active at low levels.  She can go back into the pool and walk but no aggressive activity for at least 2 to 4 weeks    Okay to follow-up with cardiology on the July 24 but we will get her in with hematology much sooner      Return in about 4 weeks (around 7/24/2024) for recheck.

## 2024-06-26 ENCOUNTER — OFFICE VISIT (OUTPATIENT)
Dept: FAMILY MEDICINE CLINIC | Facility: CLINIC | Age: 74
End: 2024-06-26

## 2024-06-26 VITALS
DIASTOLIC BLOOD PRESSURE: 80 MMHG | WEIGHT: 122.19 LBS | HEART RATE: 78 BPM | BODY MASS INDEX: 27.48 KG/M2 | HEIGHT: 56 IN | RESPIRATION RATE: 14 BRPM | SYSTOLIC BLOOD PRESSURE: 126 MMHG

## 2024-06-26 DIAGNOSIS — F43.0 STRESS REACTION: ICD-10-CM

## 2024-06-26 DIAGNOSIS — I82.4Y2 DVT, LOWER EXTREMITY, PROXIMAL, ACUTE, LEFT (HCC): Primary | ICD-10-CM

## 2024-06-26 DIAGNOSIS — K68.3 RETROPERITONEAL HEMATOMA: ICD-10-CM

## 2024-06-26 PROCEDURE — 99215 OFFICE O/P EST HI 40 MIN: CPT | Performed by: FAMILY MEDICINE

## 2024-06-27 ENCOUNTER — TELEPHONE (OUTPATIENT)
Dept: FAMILY MEDICINE CLINIC | Facility: CLINIC | Age: 74
End: 2024-06-27

## 2024-06-27 NOTE — TELEPHONE ENCOUNTER
With her stability this is okay.  She is on the blood thinner and she is overall feeling better so I think 2 weeks is appropriate

## 2024-06-27 NOTE — TELEPHONE ENCOUNTER
Pt is calling she said the earliest she can get her in on 7/15/24 with Dr. Mena, hemotology is it ok to wait? Please ask Dr. Summers pt said it seemed like she should be seen sooner. Please advise.

## 2024-06-28 ENCOUNTER — TELEPHONE (OUTPATIENT)
Dept: FAMILY MEDICINE CLINIC | Facility: CLINIC | Age: 74
End: 2024-06-28

## 2024-06-28 NOTE — TELEPHONE ENCOUNTER
Eliquis is not as common source of headache.  Muscle tension headaches can be a source in that area and she has been under a lot of stress with this.  Lets watch how it does over the next 5 days and check back on Wednesday if no great improvement in the headache frequency.

## 2024-06-28 NOTE — TELEPHONE ENCOUNTER
Pt is calling she is experiencing little headaches behind the ear on both sides. She is asking if this is a side effect for Eliquis.

## 2024-06-28 NOTE — TELEPHONE ENCOUNTER
Has been getting headaches that come and go not severe but they are behind her ears and radiate to her neck. She wants to know if she should be concerned about this.

## 2024-07-05 ENCOUNTER — TELEPHONE (OUTPATIENT)
Dept: FAMILY MEDICINE CLINIC | Facility: CLINIC | Age: 74
End: 2024-07-05

## 2024-07-05 NOTE — TELEPHONE ENCOUNTER
hematology [facility]  Received: 1 week ago  Johan Summers MD  P [facility] Clinical Staff  She had massive DVT in [place], very anxious, but stable on eliquis after clot removal,  Can we call [facility] hematology to see if they can expedite getting her in for follow up and work up for source? thanks

## 2024-07-14 NOTE — PROGRESS NOTES
Hematology/Oncology Initial Consultation Note    Patient Name: Lori Garcia  Medical Record Number: [id number]    YOB: 1950   Date of Consultation: 7/14/2024   PCP: Johan Summers MD    Reason for Consultation:  Lori Garcia was seen today for the diagnosis of DVT    History of Present Illness:      74 y/o F osteoarthritis s/p R hip and R knee replacement presenting for evaluation of LLE DVT.    - was visiting family in [place]; had \"extensive LLE DVT\" requiring thrombectomy and placement of 2 stents in 6/14/2024  - apparently developed L iliacus intramuscular hematoma same day after thrombectomy.  - she said she had been falling often prior because of weakness related to her arthritis. She freely admits that she is a very high risk for falls but she has not fallen in the last few weeks  - she has degenerative disc disease of the spine and has been getting steroid injections in her trochanteric bursa bilaterally  - since her thrombectomy her left lower leg extremity swelling has been improving. Tolerating apixaban without issues. Her abdominal pain is resolved  - she is unable to tolerate compression stockings  - reports son just built a house in [place], would like them to go back over spring    Past Medical History:  Past Medical History:    Arthritis    bilateral hips    Colon polyp    High blood pressure    High cholesterol    HYPERTENSION    Osteoarthritis    hips    OSTEOPENIA    DEXA Lspine Tscore-1.6, Hip Tscore-1.1, fem neck Tscore -2.3    Other and unspecified hyperlipidemia    Pain in joints    Visual impairment    contact lenses and glasses    Wears glasses       Past Surgical History:   Procedure Laterality Date    Back surgery  2/5/15    C3-C7 ACDF     Benign biopsy left      Benign biopsy right      Colonoscopy  2009    Dr Pedraza due 5 years    Colonoscopy      Cyst aspiration left      2003    Drain/inject large joint/bursa  1/13/2011    Performed by JONNY CADET at [facility]    Fluoroscopic guidance needle placement  1/13/2011    Performed by JONNY CADET at [facility]    Hip replacement surgery      Knee replacement surgery      right     Antwan localization wire 1 site left (cpt=19281)  1997    benign/another Bx done not sure when? also benign    Antwan localization wire 1 site right (cpt=19281)      1997 bn    Needle biopsy left  09/2009    externally done at [facility]    Other surgical history      Bilateral eyelids    Other surgical history      multiple breast biopsy    Spine surgery procedure unlisted      Total hip replacement Left 10/2015    Total hip replacement Right 12/03/2015    Upper gi endoscopy,exam  1/2013    colonoscopy       Home Medications:  No outpatient medications have been marked as taking for the 7/15/24 encounter (Appointment) with Lupillo Mena MD.     -------  Current Outpatient Medications on File Prior to Visit   Medication Sig Dispense Refill    ELIQUIS DVT/PE STARTER PACK 5 MG Oral Tablet Therapy Pack Take 1 tablet by mouth As Directed.      ACETAMINOPHEN-CODEINE 300-30 MG Oral Tab TAKE ONE TABLET BY MOUTH EVERY FOUR HOURS AS NEEDED FOR PAIN 20 tablet 0    LORazepam 0.5 MG Oral Tab 1 to 2 tabs 1 hour before procedure. May repeat 1 tab every 20 minutes to a max 2 mg before procedure as needed for anxiety related to procedure. 10 tablet 0    Losartan Potassium-HCTZ 100-12.5 MG Oral Tab Take 0.5 tablets by mouth daily. 45 tablet 3    rosuvastatin 5 MG Oral Tab Take 1 tablet (5 mg total) by mouth nightly. 90 tablet 3    Zinc 30 MG Oral Cap Take by mouth.      Calcium Carb-Cholecalciferol (CALCIUM 1000 + D OR) Take 600 mg by mouth 3 (three) times daily as needed.      acetaminophen 500 MG Oral Tab Take 2 tablets (1,000 mg total) by mouth one time. Takes 1,000mg in the morning      Multiple Vitamin (TAB-A-GENA) Oral Tab Take 1 tablet by mouth daily.      Cholecalciferol (VITAMIN D OR) Take 1 capsule by mouth daily. 2000 mg daily  30 tablet 0      No current facility-administered medications on file prior to visit.       Allergies:   Allergies   Allergen Reactions    Statins OTHER (SEE COMMENTS)     Muscle weakness to legs    Other OTHER (SEE COMMENTS)     Cats cause throat closes, wheezing, eyes swelling    Zetia [Ezetimibe] FATIGUE       Psychosocial History:  Social History     Social History Narrative    Not on file     Social History     Socioeconomic History    Marital status:    Tobacco Use    Smoking status: Never    Smokeless tobacco: Never   Vaping Use    Vaping status: Never Used   Substance and Sexual Activity    Alcohol use: Yes     Alcohol/week: 3.0 standard drinks of alcohol     Types: 3 Glasses of wine per week    Drug use: No    Sexual activity: Yes   Other Topics Concern    Caffeine Concern Yes    Exercise Yes    Seat Belt Yes     Social Determinants of Health      [facility], [facility]    Social Connections    Received from [facility], [facility]    Housing Stability       Family Medical History:  Family History   Problem Relation Age of Onset    Cancer Father     Hypertension Father     Diabetes Father     Heart Attack Father     Cancer Mother     Hypertension Sister     Hypertension Brother     Lipids Brother     Cancer Maternal Aunt 60        uterine    Uterine Cancer Maternal Aunt     Stroke Maternal Grandfather        Review of Systems:  A 10-point ROS was done with pertinent positives and negative per the HPI    Vital Signs:  Height: --  Weight: --  BSA (Calculated - sq m): --  Pulse: --  BP: --  Temp: --  Do Not Use - Resp Rate: --  SpO2: --    Wt Readings from Last 6 Encounters:   06/26/24 55.4 kg (122 lb 3.2 oz)   06/22/24 55.1 kg (121 lb 6.4 oz)   04/10/24 57 kg (125 lb 9.6 oz)   03/06/24 56.7 kg (125 lb)   11/15/23 57.2 kg (126 lb)   09/22/23 57.6 kg (127 lb)     Physical Examination:  General: Patient is alert and oriented,  not in acute distress  Psych: Mood and affect are appropriate  Eyes: EOMI, PERRL  ENT: Oropharynx is clear, no adenopathy  CV: LLE edema  Respiratory: Non-labored respirations  GI/Abd: Soft, non-tender  Neurological: Grossly intact   Lymphatics: No palpable cervical, supraclavicular, axillary, or inguinal lymphadenopathy  Skin: diffuse areas of ecchymoses    Laboratory:  Lab Results   Component Value Date    WBC 8.3 06/25/2024    WBC 7.3 11/07/2023    WBC 7.9 06/21/2023    HGB 10.8 (L) 06/25/2024    HGB 14.2 11/07/2023    HGB 14.1 06/21/2023    HCT 32.5 (L) 06/25/2024    MCV 95.9 06/25/2024    MCH 31.9 06/25/2024    MCHC 33.2 06/25/2024    RDW 13.9 06/25/2024    .0 (H) 06/25/2024    .0 11/07/2023    .0 06/21/2023     Lab Results   Component Value Date     (H) 06/25/2024    BUN 13 06/25/2024    BUNCREA 21.9 (H) 11/02/2022    CREATSERUM 0.57 06/25/2024    CREATSERUM 0.74 11/07/2023    CREATSERUM 0.70 06/21/2023    ANIONGAP 9 06/25/2024    GFR 90 12/04/2015    GFR 90 12/04/2015    GFRNAA 85 11/15/2021    GFRAA 98 11/15/2021    CA 9.7 06/25/2024    OSMOCALC 285 06/25/2024    ALKPHO 62 06/25/2024    AST 17 06/25/2024    ALT 31 06/25/2024    BILT 0.7 06/25/2024    TP 7.4 06/25/2024    ALB 3.7 06/25/2024    GLOBULIN 3.7 06/25/2024    AGRATIO 2.3 09/11/2014     06/25/2024    K 4.0 06/25/2024     06/25/2024    CO2 26.0 06/25/2024     Lab Results   Component Value Date    PTT 24.4 (L) 10/14/2019    INR 0.93 10/14/2019         Impression & Plan:     LLE DVT  - likely provoked by falls in the background of osteoarthritis  - she now has 2 LLE venous stents and a high risk of falls that complicate the picture for anticoagulation  - abdominal pain is resolved, likely resolving hematoma. Hematoma was likely caused by thrombectomy in setting of therapeutic anticoagulation for her DVT  - plan for 3 months of therapeutic anticoagulation with apixaban; currently tolerating, then plan interval US  1st week of Septepmber to assess need for ongoing anticoagulation. Given presence of venous stents she may need continued prophylactic anticoagulation vs antiplatelet agents, but will need to assess her risk of falls at follow up  - we discussed bleeding risk and major risk of intracranial and intraabdominal bleeding. I emphasized to not fall  - we discussed best practices: frequent ambulation, hydration. She does not tolerate compression stockings    Anemia  - her hemoglobin is normalized. Iron levels with adequate iron stores    High risk of falls  - physical therapy referral given     Follow up: 2nd week of 09/2024    Lupillo Mena MD  Hematology/Medical Oncology  [facility]

## 2024-07-15 ENCOUNTER — OFFICE VISIT (OUTPATIENT)
Dept: HEMATOLOGY/ONCOLOGY | Facility: HOSPITAL | Age: 74
End: 2024-07-15
Attending: INTERNAL MEDICINE
Payer: MEDICARE

## 2024-07-15 VITALS
TEMPERATURE: 97 F | OXYGEN SATURATION: 96 % | BODY MASS INDEX: 27.1 KG/M2 | DIASTOLIC BLOOD PRESSURE: 94 MMHG | HEART RATE: 99 BPM | RESPIRATION RATE: 14 BRPM | HEIGHT: 56.22 IN | SYSTOLIC BLOOD PRESSURE: 177 MMHG | WEIGHT: 122.19 LBS

## 2024-07-15 DIAGNOSIS — D64.9 ANEMIA, UNSPECIFIED TYPE: ICD-10-CM

## 2024-07-15 DIAGNOSIS — I82.4Y2 ACUTE DEEP VEIN THROMBOSIS (DVT) OF PROXIMAL VEIN OF LEFT LOWER EXTREMITY (HCC): Primary | ICD-10-CM

## 2024-07-15 DIAGNOSIS — R53.81 PHYSICAL DECONDITIONING: ICD-10-CM

## 2024-07-15 LAB
BASOPHILS # BLD AUTO: 0.06 X10(3) UL (ref 0–0.2)
BASOPHILS NFR BLD AUTO: 0.7 %
DEPRECATED HBV CORE AB SER IA-ACNC: 362.8 NG/ML
EOSINOPHIL # BLD AUTO: 0.05 X10(3) UL (ref 0–0.7)
EOSINOPHIL NFR BLD AUTO: 0.6 %
ERYTHROCYTE [DISTWIDTH] IN BLOOD BY AUTOMATED COUNT: 13.5 %
HCT VFR BLD AUTO: 38 %
HGB BLD-MCNC: 12.8 G/DL
IMM GRANULOCYTES # BLD AUTO: 0.03 X10(3) UL (ref 0–1)
IMM GRANULOCYTES NFR BLD: 0.4 %
IRON SATN MFR SERPL: 11 %
IRON SERPL-MCNC: 45 UG/DL
LYMPHOCYTES # BLD AUTO: 1.82 X10(3) UL (ref 1–4)
LYMPHOCYTES NFR BLD AUTO: 21.9 %
MCH RBC QN AUTO: 30.6 PG (ref 26–34)
MCHC RBC AUTO-ENTMCNC: 33.7 G/DL (ref 31–37)
MCV RBC AUTO: 90.9 FL
MONOCYTES # BLD AUTO: 0.99 X10(3) UL (ref 0.1–1)
MONOCYTES NFR BLD AUTO: 11.9 %
NEUTROPHILS # BLD AUTO: 5.37 X10 (3) UL (ref 1.5–7.7)
NEUTROPHILS # BLD AUTO: 5.37 X10(3) UL (ref 1.5–7.7)
NEUTROPHILS NFR BLD AUTO: 64.5 %
PLATELET # BLD AUTO: 390 10(3)UL (ref 150–450)
RBC # BLD AUTO: 4.18 X10(6)UL
TIBC SERPL-MCNC: 423 UG/DL (ref 240–450)
TRANSFERRIN SERPL-MCNC: 284 MG/DL (ref 200–360)
WBC # BLD AUTO: 8.3 X10(3) UL (ref 4–11)

## 2024-07-15 PROCEDURE — 99205 OFFICE O/P NEW HI 60 MIN: CPT | Performed by: INTERNAL MEDICINE

## 2024-07-15 PROCEDURE — G2211 COMPLEX E/M VISIT ADD ON: HCPCS | Performed by: INTERNAL MEDICINE

## 2024-07-15 NOTE — PROGRESS NOTES
Education Record    Learner:  Patient and Spouse    Disease / Diagnosis: LLE DVT    Barriers / Limitations:  None   Comments:    Method:  Discussion   Comments:    General Topics:  Medication, Side effects and symptom management, and Plan of care reviewed   Comments:    Outcome:  Shows understanding   Comments:    Here for LLE DVT- new consult. Pain is resolved. Less swelling present. Taking Eliquis 5mg BID without issue. Resuming a lot more activity.

## 2024-07-31 ENCOUNTER — HOSPITAL ENCOUNTER (OUTPATIENT)
Dept: MAMMOGRAPHY | Age: 74
Discharge: HOME | End: 2024-07-31
Attending: FAMILY MEDICINE
Payer: MEDICARE

## 2024-07-31 ENCOUNTER — OFFICE VISIT (OUTPATIENT)
Dept: FAMILY MEDICINE CLINIC | Facility: CLINIC | Age: 74
End: 2024-07-31
Payer: MEDICARE

## 2024-07-31 VITALS
OXYGEN SATURATION: 96 % | SYSTOLIC BLOOD PRESSURE: 132 MMHG | BODY MASS INDEX: 27.22 KG/M2 | RESPIRATION RATE: 16 BRPM | HEIGHT: 56 IN | HEART RATE: 68 BPM | DIASTOLIC BLOOD PRESSURE: 88 MMHG | WEIGHT: 121 LBS

## 2024-07-31 DIAGNOSIS — I82.562 CHRONIC DEEP VEIN THROMBOSIS (DVT) OF CALF MUSCLE VEIN OF LEFT LOWER EXTREMITY (HCC): ICD-10-CM

## 2024-07-31 DIAGNOSIS — F43.0 STRESS REACTION: ICD-10-CM

## 2024-07-31 DIAGNOSIS — Z12.31 SCREENING MAMMOGRAM FOR BREAST CANCER: ICD-10-CM

## 2024-07-31 DIAGNOSIS — I82.5Y3 CHRONIC DEEP VEIN THROMBOSIS (DVT) OF PROXIMAL VEIN OF BOTH LOWER EXTREMITIES (HCC): Primary | ICD-10-CM
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
## 2024-07-31 PROCEDURE — 77063 BREAST TOMOSYNTHESIS BI: CPT | Performed by: FAMILY MEDICINE

## 2024-07-31 PROCEDURE — 77067 SCR MAMMO BI INCL CAD: CPT | Performed by: FAMILY MEDICINE

## 2024-07-31 PROCEDURE — G2211 COMPLEX E/M VISIT ADD ON: HCPCS | Performed by: FAMILY MEDICINE

## 2024-07-31 PROCEDURE — 99214 OFFICE O/P EST MOD 30 MIN: CPT | Performed by: FAMILY MEDICINE

## 2024-07-31 NOTE — PROGRESS NOTES
Lori is a 73 year old female coming in for had concerns including Hospital F/U (Saw hematologist and cardiologist for blood clog).    Subjective:   HPI   DVT, saw Dr Mena and CV saw Dr russell, started on ELiquis.     Objective:   /88   Pulse 68   Resp 16   Ht 4' 8\" (1.422 m)   Wt 121 lb (54.9 kg)   SpO2 96%   BMI 27.13 kg/m²  Body mass index is 27.13 kg/m².   Physical Exam  Vitals and nursing note reviewed.   Constitutional:       General: She is not in acute distress.     Appearance: Normal appearance. She is well-developed.   HENT:      Head: Normocephalic and atraumatic.   Cardiovascular:      Rate and Rhythm: Normal rate and regular rhythm.      Pulses:           Posterior tibial pulses are 2+ on the right side and 2+ on the left side.      Heart sounds: Normal heart sounds. No murmur heard.  Pulmonary:      Effort: Pulmonary effort is normal. No respiratory distress.      Breath sounds: Normal breath sounds. No wheezing.   Abdominal:      General: Bowel sounds are normal.      Palpations: Abdomen is soft.      Tenderness: There is no abdominal tenderness.   Musculoskeletal:         General: Normal range of motion.      Cervical back: Normal range of motion.      Right lower leg: No edema.      Left lower leg: No edema.   Skin:     General: Skin is warm and dry.      Findings: No rash.   Neurological:      Mental Status: She is alert and oriented to person, place, and time.   Psychiatric:         Mood and Affect: Mood normal.         Behavior: Behavior normal.         Thought Content: Thought content normal.         Judgment: Judgment normal.           Assessment & Plan:   1. Chronic deep vein thrombosis (DVT) of proximal vein of both lower extremities (HCC) (Primary)  2. Stress reaction  3. Fall, subsequent encounter  4. Chronic deep vein thrombosis (DVT) of calf muscle vein of left lower extremity (HCC)  Overview:  Diagnosis 6/2024, Dr Mena managing, Eliquis  Assessment & Plan:  Discussed this  condition, US soon, but may need to continue meds. Discussed options for now.      I am having Lori Garcia maintain her Cholecalciferol (VITAMIN D OR), acetaminophen, Tab-A-Marissa, Calcium Carb-Cholecalciferol (CALCIUM 1000 + D OR), Zinc, rosuvastatin, Losartan Potassium-HCTZ, LORazepam, acetaminophen-codeine, Eliquis DVT/PE Starter Pack, and apixaban.       Return in about 3 months (around 10/31/2024) for recheck.

## 2024-08-03 ENCOUNTER — APPOINTMENT (OUTPATIENT)
Dept: GENERAL RADIOLOGY | Facility: HOSPITAL | Age: 74
End: 2024-08-03
Attending: EMERGENCY MEDICINE
Payer: MEDICARE

## 2024-08-03 ENCOUNTER — APPOINTMENT (OUTPATIENT)
Dept: ULTRASOUND IMAGING | Facility: HOSPITAL | Age: 74
End: 2024-08-03
Payer: MEDICARE

## 2024-08-03 ENCOUNTER — HOSPITAL ENCOUNTER (EMERGENCY)
Facility: HOSPITAL | Age: 74
Discharge: HOME | End: 2024-08-03
Attending: EMERGENCY MEDICINE
Payer: MEDICARE

## 2024-08-03 VITALS
HEIGHT: 56 IN | RESPIRATION RATE: 17 BRPM | TEMPERATURE: 98 F | BODY MASS INDEX: 26.99 KG/M2 | DIASTOLIC BLOOD PRESSURE: 96 MMHG | HEART RATE: 94 BPM | OXYGEN SATURATION: 100 % | SYSTOLIC BLOOD PRESSURE: 179 MMHG | WEIGHT: 120 LBS

## 2024-08-03 DIAGNOSIS — S76.012A HIP STRAIN, LEFT, INITIAL ENCOUNTER: Primary | ICD-10-CM

## 2024-08-03 PROCEDURE — 93971 EXTREMITY STUDY: CPT | Performed by: EMERGENCY MEDICINE

## 2024-08-03 PROCEDURE — 99284 EMERGENCY DEPT VISIT MOD MDM: CPT

## 2024-08-03 PROCEDURE — 73502 X-RAY EXAM HIP UNI 2-3 VIEWS: CPT | Performed by: EMERGENCY MEDICINE

## 2024-08-03 RX ORDER — LIDOCAINE 50 MG/G
2 PATCH TOPICAL EVERY 24 HOURS
Qty: 14 PATCH | Refills: 0 | Status: SHIPPED | OUTPATIENT
Start: 2024-08-03 | End: 2024-08-10

## 2024-08-03 NOTE — DISCHARGE INSTRUCTIONS
Take Tylenol, lidocaine patch follow-up with your primary care physician if continued symptoms follow-up with your primary for orthopedic referral    You were seen in the emergency room in a limited time.  There is a possibility that although we do not see any acute process at this present time that things can change with time.  Is therefore imperative that you follow-up with primary care physician for close follow-up.  If there is any significant progression of your pain  or other symptoms you to return immediately to the emergency room.

## 2024-08-03 NOTE — ED INITIAL ASSESSMENT (HPI)
Pt states had a DVT removed from her left leg in June, pt states has pain to left upper leg and hip x 2 days.  Sent to r/o DVT.

## 2024-08-03 NOTE — ED PROVIDER NOTES
Patient Seen in: [facility] Emergency Department      History     Chief Complaint   Patient presents with    Deep Vein Thrombosis     Stated Complaint: R/O DVT    Subjective:   HPI    This is a 73-year-old female who is got a history of what seems to be separate extensive DVT and had to have surgery while she was in [place].  The patient subsequently has been on Eliquis.  She states that she cannot fell mechanical fall and kind of went down and strained her left hip.  She did not fall directly on it she had a hip replacement there.  She was more worried that the hip pain may be related to a blood clot.  She denies any calf pain denies any fevers denies any numbness or weakness denies any back pain abdominal pain.  The pain is in her left hip is worse with movement.  The patient denies any chest pain shortness of breath fevers or chills.  The patient has a history of high blood pressure high cholesterol.    Objective:   Past Medical History:    Arthritis    bilateral hips    Colon polyp    High blood pressure    High cholesterol    HYPERTENSION    Osteoarthritis    hips    OSTEOPENIA    DEXA Lspine Tscore-1.6, Hip Tscore-1.1, fem neck Tscore -2.3    Other and unspecified hyperlipidemia    Pain in joints    Visual impairment    contact lenses and glasses    Wears glasses              Past Surgical History:   Procedure Laterality Date    Back surgery  2/5/15    C3-C7 ACDF     Benign biopsy left      Benign biopsy right      Colonoscopy  2009    Dr Pedraza due 5 years    Colonoscopy      Cyst aspiration left      2003    Drain/inject large joint/bursa  1/13/2011    Performed by JONNY CADET at [facility] [place]    Fluoroscopic guidance needle placement  1/13/2011    Performed by JONNY CADET at [facility]    Hip replacement surgery      Knee replacement surgery      right     Antwan localization wire 1 site left (cpt=19281)  1997    benign/another Bx done not sure when? also benign    Antwan  localization wire 1 site right (cpt=19281)      1997 bn    Needle biopsy left  09/2009    externally done at [facility]    Other surgical history      Bilateral eyelids    Other surgical history      multiple breast biopsy    Spine surgery procedure unlisted      Total hip replacement Left 10/2015    Total hip replacement Right 12/03/2015    Upper gi endoscopy,exam  1/2013    colonoscopy                Social History     Socioeconomic History    Marital status:    Tobacco Use    Smoking status: Never    Smokeless tobacco: Never   Vaping Use    Vaping status: Never Used   Substance and Sexual Activity    Alcohol use: Yes     Alcohol/week: 3.0 standard drinks of alcohol     Types: 3 Glasses of wine per week    Drug use: No    Sexual activity: Yes   Other Topics Concern    Caffeine Concern Yes    Exercise Yes    Seat Belt Yes     Social Determinants of Health      [facility]    Social Connections    Received from [facility], [facility]    Housing Stability              Review of Systems    Positive for stated Chief Complaint: Deep Vein Thrombosis    Other systems are as noted in HPI.  Constitutional and vital signs reviewed.      All other systems reviewed and negative except as noted above.    Physical Exam     ED Triage Vitals [08/03/24 1318]   /83   Pulse 105   Resp 18   Temp 98.2 °F (36.8 °C)   Temp src Oral   SpO2 95 %   O2 Device None (Room air)       Current Vitals:   Vital Signs  BP: (!) 179/96  Pulse: 94  Resp: 17  Temp: 98.2 °F (36.8 °C)  Temp src: Oral    Oxygen Therapy  SpO2: 100 %  O2 Device: None (Room air)            Physical Exam  General: .  Patient is a pleasant female no respiratory distress.  The patient is in no respiratory distress    HEENT: Atraumatic, conjunctiva are not pale.  There is no icterus.  Oral mucosa Is wet.  No facial trauma.  The neck is supple.  No signs of trauma in  the head or neck.    LUNGS: Clear to auscultation, there is no wheezing or retraction.  No crackles.    CV: Cardiovascular is regular without murmurs or rubs.    ABD: The abdomen is soft nondistended nontender.  There is no rebound.  There is no guarding.    EXT: There is good pulses bilaterally.  There is no calf tenderness.  There is no rash noted.  There is no edema  The left hip and there is no bruising and she has some mild pain on movement of the left hip.  There is no obvious bruising redness or cellulitis the wound from her previous surgery looks well.  There is good pulses since exam is normal patient is otherwise neurovascular intact.  There is no calf swelling laying or tenderness there is no redness or cellulitis there is good pulses bilaterally.  She has good muscle strength sensory exam is normal.  NEURO: Alert and oriented x4.  Muscle strength and sensory exam is grossly normal.  And the patient is neurologically intact with no focal findings.         ED Course   Labs Reviewed - No data to display     Workup was done to rule out a hip fracture, dislocation along with DVT.  He has a history of DVT.  I did review old records and reviewed cardiology records which showed that she they felt that the patient had May Thurner from her previous DVT and had to have surgery but I could not get the results from the [facility] at this present time.  She has no chest pain or shortness of breath           MDM      I personally reviewed the radiographs and my individual interpretation shows    No obvious fracture, no obvious DVT.    Also reviewed official report and it shows      XR HIP W OR WO PELVIS 2 OR 3 VIEWS, LEFT (CPT=73502)    Result Date: 8/3/2024  PROCEDURE:  XR HIP W OR WO PELVIS 2 OR 3 VIEWS, LEFT (CPT=73502)  TECHNIQUE:  Unilateral 2 to 3 views of the hip and pelvis if performed.  COMPARISON:  VIRIDIANA , XR, XR HIP, UNILATERAL (1 VIEW), LEFT (CPT=73500), 10/07/2015, 8:24 AM.  JAK, XR, HIP (MIN  2 VWS), LEFT XRAY, 8/09/2010, 1:48 PM.  Fall, left hip pain  PATIENT STATED HISTORY: (As transcribed by Technologist)  The patient offered no additional history at this point.               CONCLUSION:  Status post post bilateral total hip arthroplasty.  The left arthroplasty is seen in its entirely and is intact.  No acute fracture. Mild enthesopathy. Vascular stents are noted involving the common iliac and common femoral arteries on the left. Degenerative change of the spine noted.   LOCATION:  [facility]   Dictated by (CST): Marino Castillo MD on 8/03/2024 at 4:40 PM     Finalized by (CST): Marino Castillo MD on 8/03/2024 at 4:41 PM       US VENOUS DOPPLER LEG LEFT - DIAG IMG (CPT=93971)    Result Date: 8/3/2024  PROCEDURE:  US VENOUS DOPPLER LEG LEFT - DIAG IMG (CPT=93971)  COMPARISON:  [facility], US VENOUS DOPPLER LEG LEFT - DIAG IMG (CPT=93971), 6/25/2021, 5:23 PM.  INDICATIONS:  eval for DVT, hip pain  TECHNIQUE:  Real time, grey scale, and duplex ultrasound was used to evaluate the lower extremity venous system. B-mode two-dimensional images of the vascular structures, Doppler spectral analysis, and color flow.  Doppler imaging were performed.  The following veins were imaged:  Common, deep, and superficial femoral, popliteal, sapheno-femoral junction, posterior tibial veins, and the contralateral common femoral vein.  PATIENT STATED HISTORY: (As transcribed by Technologist)  Patient stated that she has left outer hip pain with ankle edema for two days and she had a blood clot removed from her left leg in June.    FINDINGS:  EXTREMITY EXAMINED:  Left lower extremity THROMBI:  None visible. COMPRESSION:  Normal compressibility, phasicity, and augmentation. OTHER:  No sonographic abnormalities noted in region of patient's pain.            CONCLUSION:  No acute deep vein thrombosis   LOCATION:  [facility]    Dictated by (CST): Marley Ramos MD on 8/03/2024 at 3:28 PM     Finalized by (CST): Rachel  MD Marley on 8/03/2024 at 3:28 PM               Discussed it could be just a hip strain.  There is no bruising there is no redness or cellulitis.  There is no obvious fracture and no DVT she is neurologically intact.  I discussed will be reasonable to try taking Tylenol and I did give her lidocaine patch but I discussed if she has increasing pain discomfort to return here.  Recommend close follow-up with her primary care physician for further evaluation treatment.                   Medical Decision Making      Disposition and Plan     Clinical Impression:  1. Hip strain, left, initial encounter         Disposition:  Discharge  8/3/2024  4:51 pm    Follow-up:  Johan Summers MD  [address] [place] [id number] [phone]    Follow up in 2 day(s)            Medications Prescribed:  Discharge Medication List as of 8/3/2024  4:54 PM        START taking these medications    Details   lidocaine 5 % External Patch Place 2 patches onto the skin daily for 7 days., Normal, Disp-14 patch, R-0

## 2024-08-05 ENCOUNTER — TELEPHONE (OUTPATIENT)
Dept: HEMATOLOGY/ONCOLOGY | Facility: HOSPITAL | Age: 74
End: 2024-08-05

## 2024-08-05 NOTE — TELEPHONE ENCOUNTER
I attempted to reach Lori. I left her a voice mail message that Dr Mena reviewed her ultra sound. She does not need to get the ultra sound he ordered. She should keep her appointment with Dr Mena. She may also call to move up her appointment to discuss her anticoagulation medication. I left the office phone number.

## 2024-08-05 NOTE — TELEPHONE ENCOUNTER
Lori called to report that she fell and was seen in the ER on 8/3/2024. She was told she strained her left hip muscle. They also did an US and told her she did not have a DVT.    Lori asked that Dr Mena remove the US report as well and let her know if he see's anything of concern.

## 2024-08-06 ENCOUNTER — PATIENT OUTREACH (OUTPATIENT)
Dept: CASE MANAGEMENT | Age: 74
End: 2024-08-06

## 2024-08-06 NOTE — PROGRESS NOTES
Pt had recent ED visit, calling to offer [facility] ED follow-up apt (discharged 08/03)    Dr Johan Summers  Family Medicine, IP Consult to Primary Care  [address] [place] [address] [address]  [facility] [address] [phone]  [facility] to call [phone] to assist w/scheduling apt

## 2024-08-07 NOTE — PROGRESS NOTES
Pt had recent ED visit, calling to offer [facility] ED follow-up apt (discharged 08/03)     Dr Johan Summers  Family Medicine, IP Consult to Primary Care  [address] [place] [address] [address]  [facility] [address] [phone]  Pt declined apt; pt advised she saw PCP last week & see Hematology next week  Closing encounter

## 2024-08-15 ENCOUNTER — OFFICE VISIT (OUTPATIENT)
Dept: HEMATOLOGY/ONCOLOGY | Facility: HOSPITAL | Age: 74
End: 2024-08-15
Attending: INTERNAL MEDICINE
Payer: MEDICARE

## 2024-08-15 VITALS
TEMPERATURE: 97 F | HEART RATE: 97 BPM | WEIGHT: 120.63 LBS | BODY MASS INDEX: 26.76 KG/M2 | OXYGEN SATURATION: 97 % | HEIGHT: 56.22 IN | SYSTOLIC BLOOD PRESSURE: 173 MMHG | DIASTOLIC BLOOD PRESSURE: 94 MMHG

## 2024-08-15 DIAGNOSIS — I82.4Y2 ACUTE DEEP VEIN THROMBOSIS (DVT) OF PROXIMAL VEIN OF LEFT LOWER EXTREMITY (HCC): Primary | ICD-10-CM

## 2024-08-15 PROCEDURE — 99215 OFFICE O/P EST HI 40 MIN: CPT | Performed by: INTERNAL MEDICINE

## 2024-08-15 NOTE — PROGRESS NOTES
Hematology/Oncology Clinic Progress Note    Patient Name: Lori Garcia  Medical Record Number: [id number]    YOB: 1950   PCP: Johan Summers MD    Reason for Consultation:  Lori Garcia was seen today for the diagnosis of DVT    Hematologic History:  - was visiting family in [place]; had \"extensive LLE DVT\" requiring thrombectomy and placement of 2 stents in 6/14/2024. Was discharged on apixaban  - apparently developed L iliacus intramuscular hematoma same day after thrombectomy.  - high fall risk  8/3/24: US venous doppler with resolved DVT    History of Present Illness:      74 y/o F osteoarthritis s/p R hip and R knee replacement presenting for follow up of LLE DVT.    - US doppler with resolved DVT, she still has a lot of apixaban pills left  - has started wearing compression stockings once a week  - asking if she can go back on steroid injections  - intermittent LLE edema relieved by compression stockings  - working with PT for balance, she admits she is a very high fall risk    Past Medical History:  Past Medical History:    Arthritis    bilateral hips    Colon polyp    High blood pressure    High cholesterol    HYPERTENSION    Osteoarthritis    hips    OSTEOPENIA    DEXA Lspine Tscore-1.6, Hip Tscore-1.1, fem neck Tscore -2.3    Other and unspecified hyperlipidemia    Pain in joints    Visual impairment    contact lenses and glasses    Wears glasses       Past Surgical History:   Procedure Laterality Date    Back surgery  2/5/15    C3-C7 ACDF     Benign biopsy left      Benign biopsy right      Colonoscopy  2009    Dr Pedraza due 5 years    Colonoscopy      Cyst aspiration left      2003    Drain/inject large joint/bursa  1/13/2011    Performed by JONNY CADET at [facility], [facility]    Fluoroscopic guidance needle placement  1/13/2011    Performed by JONNY CADET at [facility], [facility]    Hip replacement surgery      Knee replacement surgery      right     Antwan localization wire 1 site  left (cpt=19281)  1997    benign/another Bx done not sure when? also benign    Antwan localization wire 1 site right (cpt=19281)      1997 bn    Needle biopsy left  09/2009    externally done at [facility]    Other surgical history      Bilateral eyelids    Other surgical history      multiple breast biopsy    Spine surgery procedure unlisted      Total hip replacement Left 10/2015    Total hip replacement Right 12/03/2015    Upper gi endoscopy,exam  1/2013    colonoscopy       Home Medications:   apixaban 5 MG Oral Tab Take 1 tablet (5 mg total) by mouth 2 (two) times daily. 120 tablet 0    ELIQUIS DVT/PE STARTER PACK 5 MG Oral Tablet Therapy Pack Take 1 tablet by mouth As Directed.      ACETAMINOPHEN-CODEINE 300-30 MG Oral Tab TAKE ONE TABLET BY MOUTH EVERY FOUR HOURS AS NEEDED FOR PAIN 20 tablet 0    LORazepam 0.5 MG Oral Tab 1 to 2 tabs 1 hour before procedure. May repeat 1 tab every 20 minutes to a max 2 mg before procedure as needed for anxiety related to procedure. 10 tablet 0    Losartan Potassium-HCTZ 100-12.5 MG Oral Tab Take 0.5 tablets by mouth daily. 45 tablet 3    rosuvastatin 5 MG Oral Tab Take 1 tablet (5 mg total) by mouth nightly. (Patient taking differently: Take 1 tablet (5 mg total) by mouth nightly. Takes twice weekly) 90 tablet 3    Zinc 30 MG Oral Cap Take by mouth.      Calcium Carb-Cholecalciferol (CALCIUM 1000 + D OR) Take 600 mg by mouth 3 (three) times daily as needed.      acetaminophen 500 MG Oral Tab Take 2 tablets (1,000 mg total) by mouth one time. Takes 1,000mg in the morning      Multiple Vitamin (TAB-A-GENA) Oral Tab Take 1 tablet by mouth daily.      Cholecalciferol (VITAMIN D OR) Take 1 capsule by mouth daily. 2000 mg daily  30 tablet 0     -------  Current Outpatient Medications on File Prior to Visit   Medication Sig Dispense Refill    apixaban 5 MG Oral Tab Take 1 tablet (5 mg total) by mouth 2 (two) times daily. 120 tablet 0    ELIQUIS DVT/PE STARTER PACK 5 MG Oral Tablet  Therapy Pack Take 1 tablet by mouth As Directed.      ACETAMINOPHEN-CODEINE 300-30 MG Oral Tab TAKE ONE TABLET BY MOUTH EVERY FOUR HOURS AS NEEDED FOR PAIN 20 tablet 0    LORazepam 0.5 MG Oral Tab 1 to 2 tabs 1 hour before procedure. May repeat 1 tab every 20 minutes to a max 2 mg before procedure as needed for anxiety related to procedure. 10 tablet 0    Losartan Potassium-HCTZ 100-12.5 MG Oral Tab Take 0.5 tablets by mouth daily. 45 tablet 3    rosuvastatin 5 MG Oral Tab Take 1 tablet (5 mg total) by mouth nightly. (Patient taking differently: Take 1 tablet (5 mg total) by mouth nightly. Takes twice weekly) 90 tablet 3    Zinc 30 MG Oral Cap Take by mouth.      Calcium Carb-Cholecalciferol (CALCIUM 1000 + D OR) Take 600 mg by mouth 3 (three) times daily as needed.      acetaminophen 500 MG Oral Tab Take 2 tablets (1,000 mg total) by mouth one time. Takes 1,000mg in the morning      Multiple Vitamin (TAB-A-GENA) Oral Tab Take 1 tablet by mouth daily.      Cholecalciferol (VITAMIN D OR) Take 1 capsule by mouth daily. 2000 mg daily  30 tablet 0    [] lidocaine 5 % External Patch Place 2 patches onto the skin daily for 7 days. 14 patch 0     No current facility-administered medications on file prior to visit.       Allergies:   Allergies   Allergen Reactions    Statins OTHER (SEE COMMENTS)     Muscle weakness to legs    Other OTHER (SEE COMMENTS)     Cats cause throat closes, wheezing, eyes swelling    Zetia [Ezetimibe] FATIGUE       Psychosocial History:  Social History     Social History Narrative    Not on file     Social History     Socioeconomic History    Marital status:    Tobacco Use    Smoking status: Never    Smokeless tobacco: Never   Vaping Use    Vaping status: Never Used   Substance and Sexual Activity    Alcohol use: Yes     Alcohol/week: 3.0 standard drinks of alcohol     Types: 3 Glasses of wine per week    Drug use: No    Sexual activity: Yes   Other Topics Concern    Caffeine Concern  Yes    Exercise Yes    Seat Belt Yes     Social Determinants of Health      Received from [facility], [facility]    Social Connections    Received from [facility], [facility]    Housing Stability       Family Medical History:  Family History   Problem Relation Age of Onset    Cancer Father     Hypertension Father     Diabetes Father     Heart Attack Father     Cancer Mother     Hypertension Sister     Hypertension Brother     Lipids Brother     Cancer Maternal Aunt 60        uterine    Uterine Cancer Maternal Aunt     Stroke Maternal Grandfather        Review of Systems:  A 10-point ROS was done with pertinent positives and negative per the HPI    Vital Signs:  Height: 142.8 cm (4' 8.22\") (08/15 1138)  Weight: 54.7 kg (120 lb 9.6 oz) (08/15 1138)  BSA (Calculated - sq m): 1.44 sq meters (08/15 1138)  Pulse: 97 (08/15 1138)  BP: 173/94 (08/15 1138)  Temp: 97.3 °F (36.3 °C) (08/15 1138)  Do Not Use - Resp Rate: --  SpO2: 97 % (08/15 1138)    Wt Readings from Last 6 Encounters:   08/15/24 54.7 kg (120 lb 9.6 oz)   08/03/24 54.4 kg (120 lb)   07/31/24 54.9 kg (121 lb)   07/15/24 55.4 kg (122 lb 3.2 oz)   06/26/24 55.4 kg (122 lb 3.2 oz)   06/22/24 55.1 kg (121 lb 6.4 oz)     Physical Examination:  General: Patient is alert and oriented, not in acute distress  Psych: Mood and affect are appropriate  Eyes: EOMI, PERRL  ENT: Oropharynx is clear, no adenopathy  CV: LLE edema  Respiratory: Non-labored respirations  GI/Abd: Soft, non-tender  Neurological: Grossly intact   Lymphatics: No palpable cervical, supraclavicular, axillary, or inguinal lymphadenopathy  Skin: diffuse areas of ecchymoses    Laboratory:  Lab Results   Component Value Date    WBC 8.3 07/15/2024    WBC 8.3 06/25/2024    WBC 7.3 11/07/2023    HGB 12.8 07/15/2024    HGB 10.8 (L) 06/25/2024    HGB 14.2 11/07/2023    HCT 38.0 07/15/2024    MCV 90.9 07/15/2024    MCH 30.6 07/15/2024     MCHC 33.7 07/15/2024    RDW 13.5 07/15/2024    .0 07/15/2024    .0 (H) 06/25/2024    .0 11/07/2023     Lab Results   Component Value Date     (H) 06/25/2024    BUN 13 06/25/2024    BUNCREA 21.9 (H) 11/02/2022    CREATSERUM 0.57 06/25/2024    CREATSERUM 0.74 11/07/2023    CREATSERUM 0.70 06/21/2023    ANIONGAP 9 06/25/2024    GFR 90 12/04/2015    GFR 90 12/04/2015    GFRNAA 85 11/15/2021    GFRAA 98 11/15/2021    CA 9.7 06/25/2024    OSMOCALC 285 06/25/2024    ALKPHO 62 06/25/2024    AST 17 06/25/2024    ALT 31 06/25/2024    BILT 0.7 06/25/2024    TP 7.4 06/25/2024    ALB 3.7 06/25/2024    GLOBULIN 3.7 06/25/2024    AGRATIO 2.3 09/11/2014     06/25/2024    K 4.0 06/25/2024     06/25/2024    CO2 26.0 06/25/2024     Lab Results   Component Value Date    PTT 24.4 (L) 10/14/2019    INR 0.93 10/14/2019         Impression & Plan:     LLE DVT  - likely provoked by falls in the background of osteoarthritis  - she now has 2 LLE venous stents and a high risk of falls that complicate the picture for anticoagulation  - her hematoma and DVT are resolved. Discussed continuing vs discontinuing anticoagulation and risks/benefits of both in the setting of her venous stents and fall risk  - after extensive discussion, agreed with discontinuing apixaban given resolved DVT, and starting ASA 81mg given presence of venous stents for antiplatelet activity to reduce VTE recurrence  - continue PT to work on balance to reduce fall risk  - discussed warning signs of recurrent VTE  - we discussed best practices: frequent ambulation, hydration, compression stockings when tolerated    Lupillo Mena MD  Hematology/Medical Oncology  [facility]

## 2024-08-15 NOTE — PROGRESS NOTES
Education Record    Learner:  Patient and Spouse    Disease / Diagnosis: LLE DVT    Barriers / Limitations:  None   Comments:    Method:  Discussion   Comments:    General Topics:  Medication and Side effects and symptom management   Comments:    Outcome:  Shows understanding   Comments:    Here for follow up- was recently in the ER for a fall/pain and had a repeat US which showed no hematoma or DVT. She is having increasing pain in her LLE. Wondering what to do about eliquis, and if she can get a steroid injection.

## 2024-09-03 ENCOUNTER — TELEPHONE (OUTPATIENT)
Dept: HEMATOLOGY/ONCOLOGY | Facility: HOSPITAL | Age: 74
End: 2024-09-03

## 2024-09-03 NOTE — TELEPHONE ENCOUNTER
Called patient with instructions per MD.  Ok to get steroid shot.    For the plane travel, he is prescribing Eliquis day before, day of travel and day after.  Also pushing fluids and compression stockings.  She verbalizes understanding.

## 2024-09-03 NOTE — TELEPHONE ENCOUNTER
ASA    Called patient, she is asking if okay with MD if she gets steroid injections to left and possibly right shoulders due to increasing pain and mobility issues.  She is currently off Eliquis and taking baby ASA only.  Her last injection to left shoulder was last November.    Also requesting what preventative actions she should take as she is going to travel on a long plane ride and wonder if she should just take her ASA or need to take some Eliquis prior to the Plane.    Requesting a call back with instructions.

## 2024-09-06 NOTE — TELEPHONE ENCOUNTER
A user error has taken place: encounter opened in error, closed for administrative reasons.   Patient of Dr. Mena. Patient is calling to ask if she could receive a cortisone steroid shot due to her shoulders bothering her. Patient is asking for Dr. Mena's approval. Called 9/3/24 KM

## 2024-09-09 ENCOUNTER — APPOINTMENT (OUTPATIENT)
Dept: HEMATOLOGY/ONCOLOGY | Facility: HOSPITAL | Age: 74
End: 2024-09-09
Attending: INTERNAL MEDICINE
Payer: MEDICARE

## 2024-09-12 ENCOUNTER — TELEPHONE (OUTPATIENT)
Dept: FAMILY MEDICINE CLINIC | Facility: CLINIC | Age: 74
End: 2024-09-12

## 2024-09-12 NOTE — TELEPHONE ENCOUNTER
Called and talked to the patient told her it was OK to take dramamine with her current medications stay hydrated and it gets worse she needs to go to urgent care to be seen.

## 2024-09-12 NOTE — TELEPHONE ENCOUNTER
Patient is experience vertigo again. She is not sure what medication would be okay to take since she been on other medication. She said she is off the eliquis. She was wondering if she could take like meclizine or if he suggest anything that would help. She is scared to take medication due to what happened in the summer and really trust

## 2024-09-16 ENCOUNTER — TELEPHONE (OUTPATIENT)
Dept: FAMILY MEDICINE CLINIC | Facility: CLINIC | Age: 74
End: 2024-09-16

## 2024-09-16 NOTE — TELEPHONE ENCOUNTER
Patient had 2 steroid shots on Tuesday last week can how soon can I get Flu and Covid shots  Please let me know

## 2024-09-17 NOTE — TELEPHONE ENCOUNTER
Patient notified. She states she will get the covid vaccine this week and the flu vaccine next month.

## 2024-10-21 ENCOUNTER — TELEPHONE (OUTPATIENT)
Dept: FAMILY MEDICINE CLINIC | Facility: CLINIC | Age: 74
End: 2024-10-21

## 2024-10-21 DIAGNOSIS — R73.9 HYPERGLYCEMIA: ICD-10-CM

## 2024-10-21 DIAGNOSIS — Z79.899 LONG-TERM USE OF HIGH-RISK MEDICATION: ICD-10-CM

## 2024-10-21 DIAGNOSIS — E61.1 IRON DEFICIENCY: ICD-10-CM

## 2024-10-21 DIAGNOSIS — Z13.6 SCREENING FOR CARDIOVASCULAR CONDITION: ICD-10-CM

## 2024-10-21 DIAGNOSIS — I10 ESSENTIAL HYPERTENSION: ICD-10-CM

## 2024-10-21 DIAGNOSIS — Z13.29 SCREENING FOR THYROID DISORDER: ICD-10-CM

## 2024-10-21 DIAGNOSIS — Z13.0 SCREENING FOR IRON DEFICIENCY ANEMIA: ICD-10-CM

## 2024-10-21 DIAGNOSIS — E78.5 DYSLIPIDEMIA: ICD-10-CM

## 2024-10-21 DIAGNOSIS — Z13.1 SCREENING FOR DIABETES MELLITUS: Primary | ICD-10-CM

## 2024-10-21 NOTE — TELEPHONE ENCOUNTER
Please enter lab orders for the patient's upcoming physical appointment.     Physical scheduled:   Your appointments       Date & Time Appointment Department ([place])    Nov 18, 2024 12:00 PM CST Medicare Annual Well Visit with Johan Summers MD [facility] ([facility])              [facility]  [address]  [facility] [address]  [phone]           Preferred lab: [facility] ([facility])     The patient has been notified to complete fasting labs prior to their physical appointment.

## 2024-10-21 NOTE — TELEPHONE ENCOUNTER
1. Screening for diabetes mellitus (Primary)  -     Comp Metabolic Panel (14); Future; Expected date: 10/21/2024  2. Screening for iron deficiency anemia  -     CBC With Differential With Platelet; Future; Expected date: 10/21/2024  3. Screening for thyroid disorder  -     TSH W Reflex To Free T4; Future; Expected date: 10/21/2024  4. Screening for cardiovascular condition  -     Lipid Panel; Future; Expected date: 10/21/2024  5. Long-term use of high-risk medication  -     TSH W Reflex To Free T4; Future; Expected date: 10/21/2024  -     Ferritin; Future; Expected date: 10/21/2024  -     Iron And Tibc; Future; Expected date: 10/21/2024  -     CBC With Differential With Platelet; Future; Expected date: 10/21/2024  -     Comp Metabolic Panel (14); Future; Expected date: 10/21/2024  6. Hyperglycemia  -     Hemoglobin A1C; Future; Expected date: 10/21/2024  -     Comp Metabolic Panel (14); Future; Expected date: 10/21/2024  7. Dyslipidemia  -     TSH W Reflex To Free T4; Future; Expected date: 10/21/2024  -     Lipid Panel; Future; Expected date: 10/21/2024  -     Comp Metabolic Panel (14); Future; Expected date: 10/21/2024  8. Essential hypertension  Overview:  Losartan HCTZ 100/12.5  Orders:  -     TSH W Reflex To Free T4; Future; Expected date: 10/21/2024  9. Iron deficiency  -     Ferritin; Future; Expected date: 10/21/2024  -     Iron And Tibc; Future; Expected date: 10/21/2024  -     CBC With Differential With Platelet; Future; Expected date: 10/21/2024       OK to notify. Thanks, Mateo Summers MD

## 2024-11-01 ENCOUNTER — TELEPHONE (OUTPATIENT)
Dept: HEMATOLOGY/ONCOLOGY | Facility: HOSPITAL | Age: 74
End: 2024-11-01

## 2024-11-01 NOTE — TELEPHONE ENCOUNTER
Pt called stated she is supposed to have a steroid inj in her back with Dr. Beltran at pain management. The pt can't get the inj until they receive auth from Dr. Mena. bc she takes a baby Asprin and Dr. Beltran would like pt to stop taking the Asprin for 5days.    Pt indicated the doctor faxed a medical clearance to Dr. Mena's office    Dr. Beltran can be reached at [phone]

## 2024-11-05 ENCOUNTER — LAB ENCOUNTER (OUTPATIENT)
Dept: LAB | Age: 74
End: 2024-11-05
Attending: FAMILY MEDICINE
Payer: MEDICARE

## 2024-11-05 DIAGNOSIS — I10 ESSENTIAL HYPERTENSION: ICD-10-CM

## 2024-11-05 DIAGNOSIS — Z13.1 SCREENING FOR DIABETES MELLITUS: ICD-10-CM

## 2024-11-05 DIAGNOSIS — R73.9 HYPERGLYCEMIA: ICD-10-CM

## 2024-11-05 DIAGNOSIS — E78.5 DYSLIPIDEMIA: ICD-10-CM

## 2024-11-05 DIAGNOSIS — E61.1 IRON DEFICIENCY: ICD-10-CM

## 2024-11-05 DIAGNOSIS — Z13.0 SCREENING FOR IRON DEFICIENCY ANEMIA: ICD-10-CM

## 2024-11-05 DIAGNOSIS — Z13.29 SCREENING FOR THYROID DISORDER: ICD-10-CM

## 2024-11-05 DIAGNOSIS — Z79.899 LONG-TERM USE OF HIGH-RISK MEDICATION: ICD-10-CM

## 2024-11-05 DIAGNOSIS — Z13.6 SCREENING FOR CARDIOVASCULAR CONDITION: ICD-10-CM

## 2024-11-05 LAB
ALBUMIN SERPL-MCNC: 4.5 G/DL (ref 3.2–4.8)
ALBUMIN/GLOB SERPL: 1.6 {RATIO} (ref 1–2)
ALP LIVER SERPL-CCNC: 61 U/L
ALT SERPL-CCNC: 21 U/L
ANION GAP SERPL CALC-SCNC: 7 MMOL/L (ref 0–18)
AST SERPL-CCNC: 22 U/L (ref ?–34)
BASOPHILS # BLD AUTO: 0.05 X10(3) UL (ref 0–0.2)
BASOPHILS NFR BLD AUTO: 0.7 %
BILIRUB SERPL-MCNC: 0.7 MG/DL (ref 0.2–1.1)
BUN BLD-MCNC: 14 MG/DL (ref 9–23)
CALCIUM BLD-MCNC: 10.2 MG/DL (ref 8.7–10.4)
CHLORIDE SERPL-SCNC: 100 MMOL/L (ref 98–112)
CHOLEST SERPL-MCNC: 227 MG/DL (ref ?–200)
CO2 SERPL-SCNC: 29 MMOL/L (ref 21–32)
CREAT BLD-MCNC: 0.62 MG/DL
DEPRECATED HBV CORE AB SER IA-ACNC: 288 NG/ML
EGFRCR SERPLBLD CKD-EPI 2021: 93 ML/MIN/1.73M2 (ref 60–?)
EOSINOPHIL # BLD AUTO: 0.06 X10(3) UL (ref 0–0.7)
EOSINOPHIL NFR BLD AUTO: 0.8 %
ERYTHROCYTE [DISTWIDTH] IN BLOOD BY AUTOMATED COUNT: 14.9 %
EST. AVERAGE GLUCOSE BLD GHB EST-MCNC: 128 MG/DL (ref 68–126)
FASTING PATIENT LIPID ANSWER: YES
FASTING STATUS PATIENT QL REPORTED: YES
GLOBULIN PLAS-MCNC: 2.9 G/DL (ref 2–3.5)
GLUCOSE BLD-MCNC: 94 MG/DL (ref 70–99)
HBA1C MFR BLD: 6.1 % (ref ?–5.7)
HCT VFR BLD AUTO: 40.9 %
HDLC SERPL-MCNC: 76 MG/DL (ref 40–59)
HGB BLD-MCNC: 13.5 G/DL
IMM GRANULOCYTES # BLD AUTO: 0.03 X10(3) UL (ref 0–1)
IMM GRANULOCYTES NFR BLD: 0.4 %
IRON SATN MFR SERPL: 24 %
IRON SERPL-MCNC: 80 UG/DL
LDLC SERPL CALC-MCNC: 128 MG/DL (ref ?–100)
LYMPHOCYTES # BLD AUTO: 1.86 X10(3) UL (ref 1–4)
LYMPHOCYTES NFR BLD AUTO: 26.1 %
MCH RBC QN AUTO: 29.3 PG (ref 26–34)
MCHC RBC AUTO-ENTMCNC: 33 G/DL (ref 31–37)
MCV RBC AUTO: 88.9 FL
MONOCYTES # BLD AUTO: 0.76 X10(3) UL (ref 0.1–1)
MONOCYTES NFR BLD AUTO: 10.6 %
NEUTROPHILS # BLD AUTO: 4.38 X10 (3) UL (ref 1.5–7.7)
NEUTROPHILS # BLD AUTO: 4.38 X10(3) UL (ref 1.5–7.7)
NEUTROPHILS NFR BLD AUTO: 61.4 %
NONHDLC SERPL-MCNC: 151 MG/DL (ref ?–130)
OSMOLALITY SERPL CALC.SUM OF ELEC: 282 MOSM/KG (ref 275–295)
PLATELET # BLD AUTO: 351 10(3)UL (ref 150–450)
POTASSIUM SERPL-SCNC: 4 MMOL/L (ref 3.5–5.1)
PROT SERPL-MCNC: 7.4 G/DL (ref 5.7–8.2)
RBC # BLD AUTO: 4.6 X10(6)UL
SODIUM SERPL-SCNC: 136 MMOL/L (ref 136–145)
TOTAL IRON BINDING CAPACITY: 335 UG/DL (ref 250–425)
TRANSFERRIN SERPL-MCNC: 290 MG/DL (ref 250–380)
TRIGL SERPL-MCNC: 130 MG/DL (ref 30–149)
TSI SER-ACNC: 1.47 UIU/ML (ref 0.55–4.78)
VLDLC SERPL CALC-MCNC: 23 MG/DL (ref 0–30)
WBC # BLD AUTO: 7.1 X10(3) UL (ref 4–11)

## 2024-11-05 PROCEDURE — 83550 IRON BINDING TEST: CPT

## 2024-11-05 PROCEDURE — 83540 ASSAY OF IRON: CPT

## 2024-11-05 PROCEDURE — 36415 COLL VENOUS BLD VENIPUNCTURE: CPT

## 2024-11-05 PROCEDURE — 83036 HEMOGLOBIN GLYCOSYLATED A1C: CPT

## 2024-11-05 PROCEDURE — 82728 ASSAY OF FERRITIN: CPT

## 2024-11-05 PROCEDURE — 85025 COMPLETE CBC W/AUTO DIFF WBC: CPT

## 2024-11-05 PROCEDURE — 84443 ASSAY THYROID STIM HORMONE: CPT

## 2024-11-05 PROCEDURE — 80053 COMPREHEN METABOLIC PANEL: CPT

## 2024-11-05 PROCEDURE — 80061 LIPID PANEL: CPT

## 2024-11-17 NOTE — ASSESSMENT & PLAN NOTE
APTT very low in 2019, Stable, continue present management and following annual blood levels

## 2024-11-17 NOTE — ASSESSMENT & PLAN NOTE
Cholesterol shows Good control. Long term heart-healthy diet and lifestyle discussed and encouraged to reduce risk of cardiovascular disease.  11/5/2024: Cholesterol, Total 227 (H); HDL Cholesterol 76 (H); Triglycerides 130; LDL Cholesterol 128 (H)  Cholesterol Meds: rosuvastatin Tabs - 5 MG  stable  Continue with current treatment plan     Orders:    Comp Metabolic Panel (14); Future; Expected date: 05/17/2025    Lipid Panel; Future; Expected date: 05/17/2025    Detailed, Mod Complex ([id number])

## 2024-11-17 NOTE — ASSESSMENT & PLAN NOTE
Diagnosis 6/2024, Dr Mena managing, [place]  Continue to monitor and continue present management, dicsussedsafety of travel.

## 2024-11-17 NOTE — ASSESSMENT & PLAN NOTE
Stable, continue present management and continue to monitor for progression seen 2022 scar of aj. esitalopram in 2019 after several surgeries, completed by 2020    Orders:    Detailed, Mod Complex (82570)

## 2024-11-17 NOTE — ASSESSMENT & PLAN NOTE
esitalopram in 2019 after several surgeries, completed by 2020, stable, continue present management and continue to monitor for progression     Orders:    Detailed, Mod Complex ([id number])

## 2024-11-17 NOTE — ASSESSMENT & PLAN NOTE
BP shows borderline control with last BP of 134/86. Work on lifestyle changes, diet, exercise and weight management.   11/5/2024: Potassium 4.0; Creatinine 0.62; eGFR-Cr 93  BP Meds: Losartan Potassium-HCTZ Tabs - 100-12.5 MG        Orders:    CBC With Differential With Platelet; Future; Expected date: 05/17/2025    Detailed, Mod Complex ([id number])

## 2024-11-17 NOTE — ASSESSMENT & PLAN NOTE
Last Dexa Scan:    XR DEXA BONE DENSITOMETRY (CPT=77080) 12/07/2021   Stable, continue present management and continue to monitor for progression

## 2024-11-17 NOTE — ASSESSMENT & PLAN NOTE
Seen on CT 2/2022 on EBCT< mild in nature  Stable, continue present management and continue to monitor for progression

## 2024-11-18 ENCOUNTER — OFFICE VISIT (OUTPATIENT)
Dept: FAMILY MEDICINE CLINIC | Facility: CLINIC | Age: 74
End: 2024-11-18
Payer: MEDICARE

## 2024-11-18 VITALS
SYSTOLIC BLOOD PRESSURE: 134 MMHG | DIASTOLIC BLOOD PRESSURE: 86 MMHG | BODY MASS INDEX: 25.96 KG/M2 | WEIGHT: 115.38 LBS | HEIGHT: 56 IN | HEART RATE: 82 BPM | RESPIRATION RATE: 14 BRPM

## 2024-11-18 DIAGNOSIS — F33.42 RECURRENT MAJOR DEPRESSIVE DISORDER, IN FULL REMISSION (HCC): ICD-10-CM

## 2024-11-18 DIAGNOSIS — R73.03 PREDIABETES: Chronic | ICD-10-CM

## 2024-11-18 DIAGNOSIS — Z00.00 ENCOUNTER FOR ANNUAL HEALTH EXAMINATION: ICD-10-CM

## 2024-11-18 DIAGNOSIS — F43.0 STRESS REACTION: ICD-10-CM

## 2024-11-18 DIAGNOSIS — Z00.00 ANNUAL PHYSICAL EXAM: Primary | ICD-10-CM

## 2024-11-18 DIAGNOSIS — M81.0 AGE-RELATED OSTEOPOROSIS WITHOUT CURRENT PATHOLOGICAL FRACTURE: ICD-10-CM

## 2024-11-18 DIAGNOSIS — I82.562 CHRONIC DEEP VEIN THROMBOSIS (DVT) OF CALF MUSCLE VEIN OF LEFT LOWER EXTREMITY (HCC): ICD-10-CM

## 2024-11-18 DIAGNOSIS — I10 ESSENTIAL HYPERTENSION: ICD-10-CM

## 2024-11-18 DIAGNOSIS — D69.2 AUTOERYTHROCYTE SENSITIVITY DISORDER (HCC): ICD-10-CM

## 2024-11-18 DIAGNOSIS — N39.0 RECURRENT UTI: ICD-10-CM

## 2024-11-18 DIAGNOSIS — I51.5 CARDIAC CALCIFICATION (HCC): ICD-10-CM

## 2024-11-18 DIAGNOSIS — D50.0 IRON DEFICIENCY ANEMIA DUE TO CHRONIC BLOOD LOSS: ICD-10-CM

## 2024-11-18 DIAGNOSIS — E78.00 PURE HYPERCHOLESTEROLEMIA: ICD-10-CM

## 2024-11-18 DIAGNOSIS — I70.0 THORACIC AORTA ATHEROSCLEROSIS (HCC): ICD-10-CM

## 2024-11-18 RX ORDER — CEPHALEXIN 500 MG/1
500 CAPSULE ORAL 3 TIMES DAILY
Qty: 21 CAPSULE | Refills: 0 | Status: SHIPPED | OUTPATIENT
Start: 2024-11-18 | End: 2024-11-25

## 2024-11-18 RX ORDER — LORAZEPAM 0.5 MG/1
TABLET ORAL
Qty: 10 TABLET | Refills: 0 | Status: SHIPPED | OUTPATIENT
Start: 2024-11-18

## 2024-11-18 NOTE — PROGRESS NOTES
Subjective:   Lori Garcia is a 73 year old female who presents for a Subsequent Annual Wellness visit (Pt already had Initial Annual Wellness) and scheduled follow up of multiple significant but stable problems.   Dogin OK, worried about return blood clot, off eliquis, back on aspirin.   Has had PT and still a lot of arthritis  A lot of myalgia on daioy statin, good on 2x weekly. Allergy to Zetia, discussed this as well.   Leg edema and hx stents in back  Back injections for sciatica to right, mri and Dr Bardales managing.     History/Other:   Fall Risk Assessment:   She has been screened for Falls and is High Risk. Fall Prevention information provided to patient in After Visit Summary.    Do you feel unsteady when standing or walking?: Yes  Do you worry about falling?: Yes  Have you fallen in the past year?: Yes  How many times have you fallen?: 5  Were you injured?: No     Cognitive Assessment:   She had a completely normal cognitive assessment - see flowsheet entries     Functional Ability/Status:   Lori Garcia has some abnormal functions as listed below:  She has Walking problems based on screening of functional status.       Depression Screening (PHQ):  PHQ-2 SCORE: 0  , done 11/18/2024   Trouble falling or staying asleep, or sleeping too much: 1     If you checked off any problems, how difficult have these problems made it for you to do your work, take care of things at home, or get along with other people?: Not difficult at all           Advanced Directives:   She has a Living Will on file in [place]; reviewed and discussed documents with patient (and family/surrogate if present).  She has a Power of  for Health Care on file in [facility].  Discussed Advance Care Planning with patient (and family/surrogate if present). Standard forms made available to patient in After Visit Summary.      Patient Active Problem List   Diagnosis    Pure hypercholesterolemia    Essential hypertension    Status post total  replacement of both hips    History of colon polyps    Age-related osteoporosis without current pathological fracture    DDD (degenerative disc disease), lumbar    Spinal stenosis of lumbar region    Lumbar radiculitis    Family history of malignant neoplasm of digestive organ    Chronic knee pain after total replacement of right knee joint    Recurrent major depressive disorder, in full remission (HCC)    Cardiac calcification (HCC)    Thoracic aorta atherosclerosis (HCC)    Autoerythrocyte sensitivity disorder (HCC)    Chronic superficial gastritis without bleeding    Retroperitoneal hematoma    Chronic deep vein thrombosis (DVT) of calf muscle vein of left lower extremity (HCC)    Prediabetes     Allergies:  She is allergic to statins, other, and zetia [ezetimibe].    Current Medications:  Outpatient Medications Marked as Taking for the 11/18/24 encounter (Office Visit) with Johan Summers MD   Medication Sig    LORazepam 0.5 MG Oral Tab 1 to 2 tabs 1 hour before procedure. May repeat 1 tab every 20 minutes to a max 2 mg before procedure as needed for anxiety related to procedure.    cephALEXin 500 MG Oral Cap Take 1 capsule (500 mg total) by mouth 3 (three) times daily for 7 days.    apixaban 5 MG Oral Tab Take 1 tablet (5 mg total) by mouth 2 (two) times daily.    Losartan Potassium-HCTZ 100-12.5 MG Oral Tab Take 0.5 tablets by mouth daily.    rosuvastatin 5 MG Oral Tab Take 1 tablet (5 mg total) by mouth nightly. (Patient taking differently: Take 1 tablet (5 mg total) by mouth nightly. Takes twice weekly)    Zinc 30 MG Oral Cap Take by mouth.    Calcium Carb-Cholecalciferol (CALCIUM 1000 + D OR) Take 600 mg by mouth 3 (three) times daily as needed.    acetaminophen 500 MG Oral Tab Take 2 tablets (1,000 mg total) by mouth one time. Takes 1,000mg in the morning    Multiple Vitamin (TAB-A-GENA) Oral Tab Take 1 tablet by mouth daily.    Cholecalciferol (VITAMIN D OR) Take 1 capsule by mouth daily. 2000 mg daily         Medical History:  She  has a past medical history of Arthritis, Colon polyp, High blood pressure, High cholesterol, HYPERTENSION, Osteoarthritis, OSTEOPENIA (10/23/08), Other and unspecified hyperlipidemia, Pain in joints, Visual impairment, and Wears glasses.  Surgical History:  She  has a past surgical history that includes drain/inject large joint/bursa (1/13/2011); fluoroscopic guidance needle placement (1/13/2011); colonoscopy (2009); other surgical history; other surgical history; cyst aspiration left; parth localization wire 1 site right (cpt=19281); upper gi endoscopy,exam (1/2013); benign biopsy left; benign biopsy right; colonoscopy; back surgery (2/5/15); hip replacement surgery; spine surgery procedure unlisted; total hip replacement (Left, 10/2015); total hip replacement (Right, 12/03/2015); knee replacement surgery; needle biopsy left (09/2009); and parth localization wire 1 site left (cpt=19281) (1997).   Family History:  Her family history includes Cancer in her father and mother; Cancer (age of onset: 60) in her maternal aunt; Diabetes in her father; Heart Attack in her father; Hypertension in her brother, father, and sister; Lipids in her brother; Stroke in her maternal grandfather; Uterine Cancer in her maternal aunt.  Social History:  She  reports that she has never smoked. She has never used smokeless tobacco. She reports current alcohol use of about 3.0 standard drinks of alcohol per week. She reports that she does not use drugs.    Tobacco:  She has never smoked tobacco.    CAGE Alcohol Screen:   CAGE screening score of 0 on 11/18/2024, showing low risk of alcohol abuse.      Patient Care Team:  Johan Summers MD as PCP - General (Family Medicine)  eHike Trejo (DERMATOLOGY)  Surya John MD (SURGERY, ORTHOPEDIC)  Tad Flores MD (OPHTHALMOLOGY)  Lupillo Mena MD (Hematology and Oncology)    Review of Systems   Constitutional: Negative.  Negative for activity change,  appetite change, chills and fever.   HENT: Negative.     Eyes: Negative.    Respiratory: Negative.  Negative for shortness of breath.    Cardiovascular: Negative.  Negative for chest pain and palpitations.   Gastrointestinal: Negative.  Negative for abdominal pain.   Genitourinary: Negative.  Negative for dysuria.   Musculoskeletal:  Negative for arthralgias.   Skin: Negative.  Negative for rash.   Allergic/Immunologic: Negative.    Neurological: Negative.        Objective:   Physical Exam  Vitals and nursing note reviewed.   Constitutional:       General: She is not in acute distress.     Appearance: Normal appearance. She is well-developed.   HENT:      Head: Normocephalic and atraumatic.      Right Ear: Tympanic membrane and external ear normal.      Left Ear: Tympanic membrane and external ear normal.      Nose: Nose normal.      Mouth/Throat:      Mouth: Mucous membranes are moist.   Eyes:      Extraocular Movements: Extraocular movements intact.      Pupils: Pupils are equal, round, and reactive to light.   Cardiovascular:      Rate and Rhythm: Normal rate and regular rhythm.      Pulses: Normal pulses.           Carotid pulses are 2+ on the right side and 2+ on the left side.       Radial pulses are 2+ on the right side and 2+ on the left side.        Dorsalis pedis pulses are 2+ on the right side and 2+ on the left side.        Posterior tibial pulses are 2+ on the right side and 2+ on the left side.      Heart sounds: Normal heart sounds, S1 normal and S2 normal. No murmur heard.  Pulmonary:      Effort: Pulmonary effort is normal. No respiratory distress.      Breath sounds: Normal breath sounds.   Abdominal:      General: Abdomen is flat. Bowel sounds are normal. There is no distension.      Palpations: Abdomen is soft.   Musculoskeletal:         General: Normal range of motion.      Cervical back: Normal range of motion and neck supple.      Right lower leg: No edema.      Left lower leg: No edema.    Skin:     General: Skin is warm and dry.      Capillary Refill: Capillary refill takes less than 2 seconds.      Findings: No rash.   Neurological:      General: No focal deficit present.      Mental Status: She is alert and oriented to person, place, and time.   Psychiatric:         Mood and Affect: Mood normal.         Behavior: Behavior normal.         Thought Content: Thought content normal.         Judgment: Judgment normal.         /86   Pulse 82   Resp 14   Ht 4' 8\" (1.422 m)   Wt 115 lb 6.4 oz (52.3 kg)   BMI 25.87 kg/m²  Estimated body mass index is 25.87 kg/m² as calculated from the following:    Height as of this encounter: 4' 8\" (1.422 m).    Weight as of this encounter: 115 lb 6.4 oz (52.3 kg).    Medicare Hearing Assessment:   Hearing Screening    Screening Method: Whisper Test  Whisper Test Result: Pass         Visual Acuity:   Right Eye Visual Acuity: Corrected Right Eye Chart Acuity: 20/70   Left Eye Visual Acuity: Corrected Left Eye Chart Acuity: 20/30   Both Eyes Visual Acuity: Corrected (wears contacts) Both Eyes Chart Acuity: 20/30   Able To Tolerate Visual Acuity: Yes        Assessment & Plan:   Lori Garcia is a 73 year old female who presents for a Medicare Assessment.     Assessment & Plan  Annual physical exam         Autoerythrocyte sensitivity disorder (HCC)  APTT very low in 2019, Stable, continue present management and following annual blood levels         Recurrent major depressive disorder, in full remission (HCC)  esitalopram in 2019 after several surgeries, completed by 2020, stable, continue present management and continue to monitor for progression     Orders:    Detailed, Mod Complex (16679)    Cardiac calcification (HCC)  Stable, continue present management and continue to monitor for progression seen 2022 scar of heaert. esitalopram in 2019 after several surgeries, completed by 2020    Orders:    Detailed, Mod Complex (83143)    Thoracic aorta atherosclerosis  (HCC)  Seen on CT 2/2022 on EBCT< mild in nature  Stable, continue present management and continue to monitor for progression        Chronic deep vein thrombosis (DVT) of calf muscle vein of left lower extremity (HCC)  Diagnosis 6/2024, Dr Mena managingToshia  Continue to monitor and continue present management, dicsussedsafety of travel.        Pure hypercholesterolemia  Cholesterol shows Good control. Long term heart-healthy diet and lifestyle discussed and encouraged to reduce risk of cardiovascular disease.  11/5/2024: Cholesterol, Total 227 (H); HDL Cholesterol 76 (H); Triglycerides 130; LDL Cholesterol 128 (H)  Cholesterol Meds: rosuvastatin Tabs - 5 MG  stable  Continue with current treatment plan     Orders:    Comp Metabolic Panel (14); Future; Expected date: 05/17/2025    Lipid Panel; Future; Expected date: 05/17/2025    Viktoriya Mod Complex (87157)    Essential hypertension  BP shows borderline control with last BP of 134/86. Work on lifestyle changes, diet, exercise and weight management.   11/5/2024: Potassium 4.0; Creatinine 0.62; eGFR-Cr 93  BP Meds: Losartan Potassium-HCTZ Tabs - 100-12.5 MG        Orders:    CBC With Differential With Platelet; Future; Expected date: 05/17/2025    Detailed, Mod Complex (88553)    Age-related osteoporosis without current pathological fracture  Last Dexa Scan:    XR DEXA BONE DENSITOMETRY (CPT=77080) 12/07/2021   Stable, continue present management and continue to monitor for progression          Prediabetes    Orders:    Comp Metabolic Panel (14); Future; Expected date: 05/17/2025    Hemoglobin A1C; Future; Expected date: 05/17/2025    Viktoriya Mod Complex (51833)    Iron deficiency anemia due to chronic blood loss    Orders:    CBC With Differential With Platelet; Future; Expected date: 05/17/2025    Iron And Tibc; Future; Expected date: 05/17/2025    Ferritin; Future; Expected date: 05/17/2025    Viktoriya Mod Complex (81010)    Stress reaction  Stable,  continue present management and continue to monitor for progression   Orders:    LORazepam; 1 to 2 tabs 1 hour before procedure. May repeat 1 tab every 20 minutes to a max 2 mg before procedure as needed for anxiety related to procedure.  Dispense: 10 tablet; Refill: 0    Recurrent UTI  Occasional need. Refill foor as needed   Orders:    Cephalexin; Take 1 capsule (500 mg total) by mouth 3 (three) times daily for 7 days.  Dispense: 21 capsule; Refill: 0       The patient indicates understanding of these issues and agrees to the plan.  Reinforced healthy diet, lifestyle, and exercise.      Return in 6 months (on 5/18/2025).     Johan Summers MD, 11/18/2024     Supplementary Documentation:   General Health:  In the past six months, have you lost more than 10 pounds without trying?: 2 - No  Has your appetite been poor?: No  Type of Diet: Balanced  How does the patient maintain a good energy level?: Other  How would you describe your daily physical activity?: Light  How would you describe your current health state?: Fair  How do you maintain positive mental well-being?: Social Interaction;Games;Visiting Friends;Visiting Family  On a scale of 0 to 10, with 0 being no pain and 10 being severe pain, what is your pain level?: 7 - (Severe)  In the past six months, have you experienced urine leakage?: 0-No  At any time do you feel concerned for the safety/well-being of yourself and/or your children, in your home or elsewhere?: No  Have you had any immunizations at another office such as Influenza, Hepatitis B, Tetanus, or Pneumococcal?: Yes    Health Maintenance   Topic Date Due    Annual Depression Screening  01/01/2024    COVID-19 Vaccine (6 - 2024-25 season) 09/01/2024    Influenza Vaccine (1) 10/01/2024    Annual Physical  11/15/2024    Colorectal Cancer Screening  04/05/2034    DEXA Scan  Completed    Fall Risk Screening (Annual)  Completed    Pneumococcal Vaccine: 65+ Years  Completed    Zoster Vaccines  Completed     Mammogram  Discontinued

## 2024-11-18 NOTE — PATIENT INSTRUCTIONS
Lori Garcia's SCREENING SCHEDULE   Tests on this list are recommended by your physician but may not be covered, or covered at this frequency, by your insurer.   Please check with your insurance carrier before scheduling to verify coverage.   PREVENTATIVE SERVICES FREQUENCY &  COVERAGE DETAILS LAST COMPLETION DATE   Diabetes Screening    Fasting Blood Sugar /  Glucose    One screening every 12 months if never tested or if previously tested but not diagnosed with pre-diabetes   One screening every 6 months if diagnosed with pre-diabetes Lab Results   Component Value Date    GLUCOSE 90 09/11/2014    GLU 94 11/05/2024        Cardiovascular Disease Screening    Lipid Panel  Cholesterol  Lipoprotein (HDL)  Triglycerides Covered every 5 years for all Medicare beneficiaries without apparent signs or symptoms of cardiovascular disease Lab Results   Component Value Date    CHOLEST 227 (H) 11/05/2024    HDL 76 (H) 11/05/2024     (H) 11/05/2024    TRIG 130 11/05/2024         Electrocardiogram (EKG)   Covered if needed at Welcome to Medicare, and non-screening if indicated for medical reasons 01/18/2021      Ultrasound Screening for Abdominal Aortic Aneurysm (AAA) Covered once in a lifetime for one of the following risk factors   • Men who are 65-75 years old and have ever smoked   • Anyone with a family history -     Colorectal Cancer Screening  Covered for ages 50-85; only need ONE of the following:    Colonoscopy   Covered every 10 years    Covered every 2 years if patient is at high risk or previous colonoscopy was abnormal 04/05/2024    Health Maintenance   Topic Date Due   • Colorectal Cancer Screening  04/05/2034       Flexible Sigmoidoscopy   Covered every 4 years -    Fecal Occult Blood Test Covered annually -   Bone Density Screening    Bone density screening    Covered every 2 years after age 65 if diagnosed with risk of osteoporosis or estrogen deficiency.    Covered yearly for long-term glucocorticoid  medication use (Steroids) Last Dexa Scan:    XR DEXA BONE DENSITOMETRY (CPT=77080) 12/07/2021      No recommendations at this time   Pap and Pelvic    Pap   Covered every 2 years for women at normal risk; Annually if at high risk -  No recommendations at this time    Chlamydia Annually if high risk -  No recommendations at this time   Screening Mammogram    Mammogram     Recommend annually for all female patients aged 40 and older    One baseline mammogram covered for patients aged 35-39 07/31/2024    Health Maintenance   Topic Date Due   • Mammogram  Discontinued       Immunizations    Influenza Covered once per flu season  Please get every year -  No recommendations at this time    Pneumococcal Each vaccine ([id number] & [id number]) covered once after 65 Prevnar 13: 11/13/2015    Cydkbnici53: 10/01/2014     No recommendations at this time    Hepatitis B One screening covered for patients with certain risk factors   -  No recommendations at this time    Tetanus Toxoid Not covered by Medicare Part B unless medically necessary (cut with metal); may be covered with your pharmacy prescription benefits -    Tetanus, Diptheria and Pertusis TD and TDaP Not covered by Medicare Part B -  No recommendations at this time    Zoster Not covered by Medicare Part B; may be covered with your pharmacy  prescription benefits 11/12/2013  No recommendations at this time     Annual Monitoring of Persistent Medications (ACE/ARB, digoxin diuretics, anticonvulsants)    Potassium Annually Lab Results   Component Value Date    K 4.0 11/05/2024         Creatinine   Annually Lab Results   Component Value Date    CREATSERUM 0.62 11/05/2024         BUN Annually Lab Results   Component Value Date    BUN 14 11/05/2024       Drug Serum Conc Annually No results found for: \"DIGOXIN\", \"DIG\", \"VALP\"

## 2024-11-19 NOTE — ASSESSMENT & PLAN NOTE
Orders:    Comp Metabolic Panel (14); Future; Expected date: 05/17/2025    Hemoglobin A1C; Future; Expected date: 05/17/2025    Detailed, Mod Complex (14530)

## 2024-11-20 ENCOUNTER — OFFICE VISIT (OUTPATIENT)
Facility: LOCATION | Age: 74
End: 2024-11-20
Payer: MEDICARE

## 2024-11-20 DIAGNOSIS — H61.23 BILATERAL IMPACTED CERUMEN: Primary | ICD-10-CM

## 2024-11-20 PROCEDURE — 92504 EAR MICROSCOPY EXAMINATION: CPT | Performed by: OTOLARYNGOLOGY

## 2024-11-20 PROCEDURE — 99214 OFFICE O/P EST MOD 30 MIN: CPT | Performed by: OTOLARYNGOLOGY

## 2024-11-20 NOTE — PROGRESS NOTES
Lori Garcia is a 73 year old female. No chief complaint on file.    HPI:   73-year-old female typically seen for wax impaction no otorrhea otalgia vertigo tinnitus she has had previous workup for some swallowing difficulties after her neck spinal surgery.  Still having test to a certain extent.  Current Outpatient Medications   Medication Sig Dispense Refill    LORazepam 0.5 MG Oral Tab 1 to 2 tabs 1 hour before procedure. May repeat 1 tab every 20 minutes to a max 2 mg before procedure as needed for anxiety related to procedure. 10 tablet 0    cephALEXin 500 MG Oral Cap Take 1 capsule (500 mg total) by mouth 3 (three) times daily for 7 days. 21 capsule 0    apixaban 5 MG Oral Tab Take 1 tablet (5 mg total) by mouth 2 (two) times daily. 60 tablet 0    Losartan Potassium-HCTZ 100-12.5 MG Oral Tab Take 0.5 tablets by mouth daily. 45 tablet 3    rosuvastatin 5 MG Oral Tab Take 1 tablet (5 mg total) by mouth nightly. (Patient taking differently: Take 1 tablet (5 mg total) by mouth nightly. Takes twice weekly) 90 tablet 3    Zinc 30 MG Oral Cap Take by mouth.      Calcium Carb-Cholecalciferol (CALCIUM 1000 + D OR) Take 600 mg by mouth 3 (three) times daily as needed.      acetaminophen 500 MG Oral Tab Take 2 tablets (1,000 mg total) by mouth one time. Takes 1,000mg in the morning      Multiple Vitamin (TAB-A-GENA) Oral Tab Take 1 tablet by mouth daily.      Cholecalciferol (VITAMIN D OR) Take 1 capsule by mouth daily. 2000 mg daily  30 tablet 0      Past Medical History:    Arthritis    bilateral hips    Colon polyp    High blood pressure    High cholesterol    HYPERTENSION    Osteoarthritis    hips    OSTEOPENIA    DEXA Lspine Tscore-1.6, Hip Tscore-1.1, fem neck Tscore -2.3    Other and unspecified hyperlipidemia    Pain in joints    Visual impairment    contact lenses and glasses    Wears glasses      Social History:  Social History     Socioeconomic History    Marital status:    Tobacco Use    Smoking status:  Never    Smokeless tobacco: Never   Vaping Use    Vaping status: Never Used   Substance and Sexual Activity    Alcohol use: Yes     Alcohol/week: 3.0 standard drinks of alcohol     Types: 3 Glasses of wine per week    Drug use: No    Sexual activity: Yes   Other Topics Concern    Caffeine Concern Yes    Exercise Yes    Seat Belt Yes     Social Drivers of Health      Received from [facility], [facility]    Social Connections    Received from [facility], [facility]    Housing Stability      Past Surgical History:   Procedure Laterality Date    Back surgery  2/5/15    C3-C7 ACDF     Benign biopsy left      Benign biopsy right      Colonoscopy  2009    Dr Pedraza due 5 years    Colonoscopy      Cyst aspiration left      2003    Drain/inject large joint/bursa  1/13/2011    Performed by JONNY CADET at [facility], [facility]    Fluoroscopic guidance needle placement  1/13/2011    Performed by JONNY CADET at [facility], [facility]    Hip replacement surgery      Knee replacement surgery      right     Antwan localization wire 1 site left (cpt=19281)  1997    benign/another Bx done not sure when? also benign    Antwan localization wire 1 site right (cpt=19281)      1997 bn    Needle biopsy left  09/2009    externally done at [facility]    Other surgical history      Bilateral eyelids    Other surgical history      multiple breast biopsy    Spine surgery procedure unlisted      Total hip replacement Left 10/2015    Total hip replacement Right 12/03/2015    Upper gi endoscopy,exam  1/2013    colonoscopy         REVIEW OF SYSTEMS:   GENERAL HEALTH: feels well otherwise  GENERAL : denies fever, chills, sweats, weight loss, weight gain  SKIN: denies any unusual skin lesions or rashes  RESPIRATORY: denies shortness of breath with exertion  NEURO: denies headaches    EXAM:   There were no vitals taken for this visit.    System Pertinent findings  Details   Constitutional  Overall appearance - Normal.   Head/Face  Facial features -- Normal. Skull - Normal.   Eyes  Pupils equal ,round ,react to light and accomidate   Ears  External Ear Right: Normal, Left: Normal. Canal - Right: Normal, Left: Normal. TM - Right: Wax removed TM normal left: TM normal   Nose  External Nose, Normal, Septum -midline,Nasal Vault, clear. Turbinates - Right: Normal left: Normal   Mouth/Throat  Lips/teeth/gums - Normal. Tonsils -1+ oropharynx - Normal.   Neck Exam  Inspection - Normal. Palpation - Normal. Parotid gland - Normal. Thyroid gland -normal   Lymph Detail  Submental. Submandibular. Anterior cervical. Posterior cervical. Supraclavicular.   Patients exam showed wax impaction right ear, wax impaction left ear. Ear wax was removed under the operative microscope. No complications. Patient tolerated the procedure well. Ear exam findings listed in note after removal.      ASSESSMENT AND PLAN:   1. Bilateral impacted cerumen  Will follow-up as needed for wax  Starting her swallowing possibly might want to see a gastroenterologist if this is persistent      The patient indicates understanding of these issues and agrees to the plan.      Alistair Brewster MD  11/20/2024  2:09 PM

## 2024-12-09 ENCOUNTER — HOSPITAL ENCOUNTER (OUTPATIENT)
Dept: ULTRASOUND IMAGING | Age: 74
Discharge: HOME | End: 2024-12-09
Attending: INTERNAL MEDICINE
Payer: MEDICARE

## 2024-12-09 ENCOUNTER — TELEPHONE (OUTPATIENT)
Dept: HEMATOLOGY/ONCOLOGY | Facility: HOSPITAL | Age: 74
End: 2024-12-09

## 2024-12-09 DIAGNOSIS — I82.4Y2 ACUTE DEEP VEIN THROMBOSIS (DVT) OF PROXIMAL VEIN OF LEFT LOWER EXTREMITY (HCC): ICD-10-CM

## 2024-12-09 PROCEDURE — 93971 EXTREMITY STUDY: CPT | Performed by: INTERNAL MEDICINE

## 2024-12-09 NOTE — TELEPHONE ENCOUNTER
Lori called to let Dr Mena know that she had her ultra sound venous doppler of her left leg done today. She would like Dr Mena or his nurse to call her about the results. She already received the results, but does not understand them. She is requesting a call back at her home number [phone] since she is at home today.

## 2024-12-17 ENCOUNTER — OFFICE VISIT (OUTPATIENT)
Age: 74
End: 2024-12-17
Attending: INTERNAL MEDICINE
Payer: MEDICARE

## 2024-12-17 DIAGNOSIS — R19.09 GROIN MASS: ICD-10-CM

## 2024-12-17 DIAGNOSIS — I82.4Y2 ACUTE DEEP VEIN THROMBOSIS (DVT) OF PROXIMAL VEIN OF LEFT LOWER EXTREMITY (HCC): Primary | ICD-10-CM

## 2024-12-18 NOTE — PROGRESS NOTES
Hematology/Oncology Clinic Progress Note    Patient Name: Lori Garcia  Medical Record Number: [id number]    YOB: 1950   PCP: Johan Summers MD    Reason for Consultation:  Lori Garcia was seen today for the diagnosis of DVT    Hematologic History:  - was visiting family in [place]; had \"extensive LLE DVT\" requiring thrombectomy and placement of 2 stents in 6/14/2024. Was discharged on apixaban  - apparently developed L iliacus intramuscular hematoma same day after thrombectomy.  - high fall risk  8/3/24: US venous doppler with resolved DVT  12/9/24: US venous doppler with resolved DVT, but heterogenous mass at left groin, suggesting hematoma.   12/11/24: US venous doppler at [facility] with resolved DVT, but left groin mass. CTA of lower extremities report unremarkable CTA with 2 left sided iliac stents, but no mention of left groin mass is made on report    History of Present Illness:      75 y/o F osteoarthritis s/p R hip and R knee replacement presenting for follow up of LLE DVT.    - remains off eliquis, on ASA 81mg  - she thinks she fell the week before her ultrasound on her knees  - she was hospitalized at [facility]; repeat US showed persistent left groin mass concerning for hematoma however no mention of this is made on her CTA that was done same day as her venous doppler    Past Medical History:  Past Medical History:    Arthritis    bilateral hips    Colon polyp    High blood pressure    High cholesterol    HYPERTENSION    Osteoarthritis    hips    OSTEOPENIA    DEXA Lspine Tscore-1.6, Hip Tscore-1.1, fem neck Tscore -2.3    Other and unspecified hyperlipidemia    Pain in joints    Visual impairment    contact lenses and glasses    Wears glasses       Past Surgical History:   Procedure Laterality Date    Back surgery  2/5/15    C3-C7 ACDF     Benign biopsy left      Benign biopsy right      Colonoscopy  2009    Dr Pedraza due 5 years    Colonoscopy      Cyst aspiration left      2003     Drain/inject large joint/bursa  2011    Performed by JONNY CADET at [facility], [facility]    Fluoroscopic guidance needle placement  2011    Performed by JONNY CADET at [facility], [facility]    Hip replacement surgery      Knee replacement surgery      right     Antwan localization wire 1 site left (cpt=19281)      benign/another Bx done not sure when? also benign    Antwan localization wire 1 site right (cpt=19281)       bn    Needle biopsy left  2009    externally done at [facility]    Other surgical history      Bilateral eyelids    Other surgical history      multiple breast biopsy    Spine surgery procedure unlisted      Total hip replacement Left 10/2015    Total hip replacement Right 2015    Upper gi endoscopy,exam  2013    colonoscopy       Home Medications:  No outpatient medications have been marked as taking for the 24 encounter (Office Visit) with Lupillo Mena MD.     -------  Current Outpatient Medications on File Prior to Visit   Medication Sig Dispense Refill    LORazepam 0.5 MG Oral Tab 1 to 2 tabs 1 hour before procedure. May repeat 1 tab every 20 minutes to a max 2 mg before procedure as needed for anxiety related to procedure. 10 tablet 0    [] cephALEXin 500 MG Oral Cap Take 1 capsule (500 mg total) by mouth 3 (three) times daily for 7 days. 21 capsule 0    apixaban 5 MG Oral Tab Take 1 tablet (5 mg total) by mouth 2 (two) times daily. 60 tablet 0    Losartan Potassium-HCTZ 100-12.5 MG Oral Tab Take 0.5 tablets by mouth daily. 45 tablet 3    rosuvastatin 5 MG Oral Tab Take 1 tablet (5 mg total) by mouth nightly. (Patient taking differently: Take 1 tablet (5 mg total) by mouth nightly. Takes twice weekly) 90 tablet 3    Zinc 30 MG Oral Cap Take by mouth.      Calcium Carb-Cholecalciferol (CALCIUM 1000 + D OR) Take 600 mg by mouth 3 (three) times daily as needed.      acetaminophen 500 MG Oral Tab Take 2 tablets (1,000 mg total) by mouth one time.  Takes 1,000mg in the morning      Multiple Vitamin (TAB-A-GENA) Oral Tab Take 1 tablet by mouth daily.      Cholecalciferol (VITAMIN D OR) Take 1 capsule by mouth daily. 2000 mg daily  30 tablet 0     No current facility-administered medications on file prior to visit.       Allergies:   Allergies   Allergen Reactions    Statins OTHER (SEE COMMENTS)     Muscle weakness to legs    Other OTHER (SEE COMMENTS)     Cats cause throat closes, wheezing, eyes swelling    Zetia [Ezetimibe] FATIGUE       Psychosocial History:  Social History     Social History Narrative    Not on file     Social History     Socioeconomic History    Marital status:    Tobacco Use    Smoking status: Never    Smokeless tobacco: Never   Vaping Use    Vaping status: Never Used   Substance and Sexual Activity    Alcohol use: Yes     Alcohol/week: 3.0 standard drinks of alcohol     Types: 3 Glasses of wine per week    Drug use: No    Sexual activity: Yes   Other Topics Concern    Caffeine Concern Yes    Exercise Yes    Seat Belt Yes     Social Drivers of Health      [facility]    Social Connections    Received from [facility], [facility]    Housing Stability       Family Medical History:  Family History   Problem Relation Age of Onset    Cancer Father     Hypertension Father     Diabetes Father     Heart Attack Father     Cancer Mother     Hypertension Sister     Hypertension Brother     Lipids Brother     Cancer Maternal Aunt 60        uterine    Uterine Cancer Maternal Aunt     Stroke Maternal Grandfather        Review of Systems:  A 10-point ROS was done with pertinent positives and negative per the HPI    Vital Signs:  Height: --  Weight: --  BSA (Calculated - sq m): --  Pulse: --  BP: --  Temp: --  Do Not Use - Resp Rate: --  SpO2: --    Wt Readings from Last 6 Encounters:   11/18/24 52.3 kg (115 lb 6.4 oz)   08/15/24 54.7 kg (120 lb  9.6 oz)   08/03/24 54.4 kg (120 lb)   07/31/24 54.9 kg (121 lb)   07/15/24 55.4 kg (122 lb 3.2 oz)   06/26/24 55.4 kg (122 lb 3.2 oz)     Physical Examination:  General: Patient is alert and oriented, not in acute distress  Psych: Mood and affect are appropriate  Eyes: EOMI, PERRL  ENT: Oropharynx is clear, no adenopathy  CV: LLE edema  Respiratory: Non-labored respirations  GI/Abd: Soft, non-tender  Neurological: Grossly intact   Lymphatics: No palpable cervical, supraclavicular, axillary, or inguinal lymphadenopathy  : chaperoned exam; I do not feel any groin mass at all  Skin: diffuse areas of ecchymoses    Laboratory:  Lab Results   Component Value Date    WBC 7.1 11/05/2024    WBC 8.3 07/15/2024    WBC 8.3 06/25/2024    HGB 13.5 11/05/2024    HGB 12.8 07/15/2024    HGB 10.8 (L) 06/25/2024    HCT 40.9 11/05/2024    MCV 88.9 11/05/2024    MCH 29.3 11/05/2024    MCHC 33.0 11/05/2024    RDW 14.9 11/05/2024    .0 11/05/2024    .0 07/15/2024    .0 (H) 06/25/2024     Lab Results   Component Value Date    GLU 94 11/05/2024    BUN 14 11/05/2024    BUNCREA 21.9 (H) 11/02/2022    CREATSERUM 0.62 11/05/2024    CREATSERUM 0.57 06/25/2024    CREATSERUM 0.74 11/07/2023    ANIONGAP 7 11/05/2024    GFR 90 12/04/2015    GFR 90 12/04/2015    GFRNAA 85 11/15/2021    GFRAA 98 11/15/2021    CA 10.2 11/05/2024    OSMOCALC 282 11/05/2024    ALKPHO 61 11/05/2024    AST 22 11/05/2024    ALT 21 11/05/2024    BILT 0.7 11/05/2024    TP 7.4 11/05/2024    ALB 4.5 11/05/2024    GLOBULIN 2.9 11/05/2024    AGRATIO 2.3 09/11/2014     11/05/2024    K 4.0 11/05/2024     11/05/2024    CO2 29.0 11/05/2024     Lab Results   Component Value Date    PTT 24.4 (L) 10/14/2019    INR 0.93 10/14/2019         Impression & Plan:     LLE DVT  - likely provoked by falls in the background of osteoarthritis  - she now has 2 LLE venous stents and a high risk of falls that complicate the picture for anticoagulation  - her DVT is  resolved; recommended daily ASA 81mg given presence of venous stents  - counseled to avoid falls  - we discussed best practices: frequent ambulation, hydration, compression stockings when tolerated    L groin mass  - I do not feel any on exam today. I asked her to obtain CD of CT from [facility] and give to us for radiology review    F/u: pending radiology review. Will f/u when she gives us CD    Lupillo Mena MD  Hematology/Medical Oncology  [facility]

## 2025-01-03 ENCOUNTER — TELEPHONE (OUTPATIENT)
Age: 75
End: 2025-01-03

## 2025-01-03 NOTE — TELEPHONE ENCOUNTER
Spoke with patient. MD received disc. Will review at upcoming tumor board and reach out to patient after. Patient verbalized understanding.

## 2025-01-03 NOTE — TELEPHONE ENCOUNTER
The patient is calling to see if Dr. Mena received the disc he requested from [facility]. Can you check to see if it have been put in EPIC for her and give her a call back.

## 2025-01-08 ENCOUNTER — TELEPHONE (OUTPATIENT)
Age: 75
End: 2025-01-08

## 2025-01-08 NOTE — TELEPHONE ENCOUNTER
I attempted to reach Lori. I left a voice mail message with the My Chart message CRISPIN Becerra for Dr Mena sent her today. I left the office phone number if she has any other questions or concerns.

## 2025-01-08 NOTE — TELEPHONE ENCOUNTER
Lori called and said Dr Mena was going to speak with another doctor and call her today regarding some test results. She has not received a call back yet. She is having trouble getting into her [employer] account. She needs a call back.    I told her I would update Dr Mena and his nurse now.

## 2025-01-20 DIAGNOSIS — I10 ESSENTIAL HYPERTENSION: ICD-10-CM

## 2025-01-20 RX ORDER — LOSARTAN POTASSIUM AND HYDROCHLOROTHIAZIDE 12.5; 1 MG/1; MG/1
0.5 TABLET ORAL DAILY
Qty: 45 TABLET | Refills: 0 | Status: SHIPPED | OUTPATIENT
Start: 2025-01-20

## 2025-01-20 NOTE — TELEPHONE ENCOUNTER
Requested Prescriptions     Signed Prescriptions Disp Refills    LOSARTAN POTASSIUM-HCTZ 100-12.5 MG Oral Tab 45 tablet 0     Sig: TAKE ONE-HALF TABLET BY MOUTH DAILY     Authorizing Provider: ROBERTO CARLOS GALINDO     Ordering User: VITO DANIELS      Refilled per protocol/OV notes    
pt with new onset a.fib, will get labs, admit

## 2025-01-22 ENCOUNTER — TELEPHONE (OUTPATIENT)
Dept: FAMILY MEDICINE CLINIC | Facility: CLINIC | Age: 75
End: 2025-01-22

## 2025-01-22 ENCOUNTER — TELEPHONE (OUTPATIENT)
Facility: LOCATION | Age: 75
End: 2025-01-22

## 2025-01-22 DIAGNOSIS — H81.10 BENIGN PAROXYSMAL POSITIONAL VERTIGO, UNSPECIFIED LATERALITY: Primary | ICD-10-CM

## 2025-01-22 NOTE — TELEPHONE ENCOUNTER
Pt is requesting an order/referral for physical therapy for vertigo. Pt's last office visit was 11/20/24 for wax removal. Pt has not had a hearing test at the office. Please advise.

## 2025-01-22 NOTE — TELEPHONE ENCOUNTER
Pt states the order needs to be placed to [facility] on [address]. Ph [phone]  For nancy galeano.  [facility] does not do these type of therapy

## 2025-01-22 NOTE — TELEPHONE ENCOUNTER
LOV 11/18/24 with  for annual exam.Lori has periodic vertigo with the room spinning, going from lying sitting. It started again slightly, Monday and was worse today.She goes to PT for the Maneuver and the problem is cleared immediately but she needs a referral.She sees ENT  every 6 months for her ears to be cleaned, last visit November 2025. I suggested she call  for a referral. He is not in the office this morning and she was hoping  would give her a referral sooner.     [facility] the [facility]:    [facility]  [address]  [place], [place]  Telephone [phone]  Fax [phone]    For your review  routed to

## 2025-01-22 NOTE — TELEPHONE ENCOUNTER
Patient called that she needs a [facility] script to [facility] for Agustin Madhav to help with her vertigo to do a moneuver therapy to help with the vertigo symptoms     [facility] [phone]

## 2025-01-23 NOTE — TELEPHONE ENCOUNTER
Pt is wanting confirmation for doctor regarding that after her vertigo maneuver therapy tomorrow with [facility], if it will be safe for her to fly next Wednesday? Please advise.

## 2025-04-03 ENCOUNTER — HOSPITAL ENCOUNTER (OUTPATIENT)
Dept: ULTRASOUND IMAGING | Facility: HOSPITAL | Age: 75
Discharge: HOME | End: 2025-04-03
Attending: INTERNAL MEDICINE
Payer: MEDICARE

## 2025-04-03 DIAGNOSIS — R19.09 GROIN MASS: ICD-10-CM

## 2025-04-03 DIAGNOSIS — M62.89 OTHER SPECIFIED DISORDERS OF MUSCLE: ICD-10-CM

## 2025-04-03 PROCEDURE — 76882 US LMTD JT/FCL EVL NVASC XTR: CPT | Performed by: INTERNAL MEDICINE

## 2025-04-29 ENCOUNTER — TELEPHONE (OUTPATIENT)
Facility: LOCATION | Age: 75
End: 2025-04-29

## 2025-05-01 DIAGNOSIS — I10 ESSENTIAL HYPERTENSION: ICD-10-CM

## 2025-05-01 RX ORDER — LOSARTAN POTASSIUM AND HYDROCHLOROTHIAZIDE 12.5; 1 MG/1; MG/1
0.5 TABLET ORAL DAILY
Qty: 45 TABLET | Refills: 0 | Status: SHIPPED | OUTPATIENT
Start: 2025-05-01

## 2025-05-01 NOTE — TELEPHONE ENCOUNTER
Patient is calling that she is needing a refill on medication     LOSARTAN POTASSIUM-HCTZ 100-12.5 MG Oral Tab     [id number] - [place], [place] - [address] [phone]

## 2025-05-01 NOTE — TELEPHONE ENCOUNTER
Requested Prescriptions     Signed Prescriptions Disp Refills    Losartan Potassium-HCTZ 100-12.5 MG Oral Tab 45 tablet 0     Sig: Take 0.5 tablets by mouth daily.     Authorizing Provider: ROBERTO CARLOS GALINDO     Ordering User: DHRUV GRAHAM      Refmilena per protocol/OV notes

## 2025-05-05 ENCOUNTER — TELEPHONE (OUTPATIENT)
Dept: FAMILY MEDICINE CLINIC | Facility: CLINIC | Age: 75
End: 2025-05-05

## 2025-05-05 DIAGNOSIS — R23.9 UNSPECIFIED SKIN CHANGES: ICD-10-CM

## 2025-05-05 DIAGNOSIS — D69.2 AUTOERYTHROCYTE SENSITIVITY DISORDER: ICD-10-CM

## 2025-05-05 DIAGNOSIS — R53.83 FATIGUE, UNSPECIFIED TYPE: ICD-10-CM

## 2025-05-05 DIAGNOSIS — R23.3 PETECHIAL RASH: ICD-10-CM

## 2025-05-05 DIAGNOSIS — M25.50 ARTHRALGIA, UNSPECIFIED JOINT: Primary | ICD-10-CM

## 2025-05-05 DIAGNOSIS — M81.0 AGE-RELATED OSTEOPOROSIS WITHOUT CURRENT PATHOLOGICAL FRACTURE: ICD-10-CM

## 2025-05-05 NOTE — TELEPHONE ENCOUNTER
Patient called needing clarification on if she needs blood work done before her 5/22/2025. There was some put in on 11/18/2024. Note not saying from her appointment if she needs to complete again

## 2025-05-05 NOTE — TELEPHONE ENCOUNTER
Arthritis is acting up and was wondering if there are any tests to add on to her labs prior to her appointment on 5/22/25.  Worried about RA.

## 2025-05-06 NOTE — TELEPHONE ENCOUNTER
1. Arthralgia, unspecified joint (Primary)  -     C-Reactive Protein; Future; Expected date: 05/06/2025  -     Sed Rate, Westergren (Automated); Future; Expected date: 05/06/2025  -     Connective Tissue Disease (RONEY) Screen, Reflex Specific Antibody; Future; Expected date: 05/06/2025  -     Rheumatoid Arthritis Factor; Future; Expected date: 05/06/2025  -     Cyclic Citrullinate Pep. IGG; Future; Expected date: 05/06/2025  -     Uric Acid; Future; Expected date: 05/06/2025  2. Petechial rash  -     C-Reactive Protein; Future; Expected date: 05/06/2025  -     Sed Rate, Westergren (Automated); Future; Expected date: 05/06/2025  3. Fatigue, unspecified type  -     C-Reactive Protein; Future; Expected date: 05/06/2025  -     Sed Rate, Westergren (Automated); Future; Expected date: 05/06/2025  -     Connective Tissue Disease (RONEY) Screen, Reflex Specific Antibody; Future; Expected date: 05/06/2025  -     Rheumatoid Arthritis Factor; Future; Expected date: 05/06/2025  -     Cyclic Citrullinate Pep. IGG; Future; Expected date: 05/06/2025  -     Uric Acid; Future; Expected date: 05/06/2025  4. Unspecified skin changes  -     C-Reactive Protein; Future; Expected date: 05/06/2025  -     Sed Rate, Westergren (Automated); Future; Expected date: 05/06/2025  5. Age-related osteoporosis without current pathological fracture  Overview:  Dexa -2.4 in forearm in 2017, -2.6 in 12/2021  Orders:  -     C-Reactive Protein; Future; Expected date: 05/06/2025  -     Sed Rate, Westergren (Automated); Future; Expected date: 05/06/2025  6. Autoerythrocyte sensitivity disorder  Overview:  APTT very low in 2019  Orders:  -     C-Reactive Protein; Future; Expected date: 05/06/2025  -     Sed Rate, Westergren (Automated); Future; Expected date: 05/06/2025       OK to notify. Ok for labs set for 5/17 from fall and these labs. Thanks, Mateo Summers MD

## 2025-05-12 ENCOUNTER — OFFICE VISIT (OUTPATIENT)
Facility: LOCATION | Age: 75
End: 2025-05-12
Payer: MEDICARE

## 2025-05-12 DIAGNOSIS — H91.90 SUBJECTIVE HEARING LOSS: ICD-10-CM

## 2025-05-12 DIAGNOSIS — H61.23 BILATERAL IMPACTED CERUMEN: Primary | ICD-10-CM

## 2025-05-12 DIAGNOSIS — H93.293 ABNORMAL AUDITORY PERCEPTION OF BOTH EARS: Primary | ICD-10-CM

## 2025-05-12 PROCEDURE — 99214 OFFICE O/P EST MOD 30 MIN: CPT | Performed by: OTOLARYNGOLOGY

## 2025-05-12 PROCEDURE — 92557 COMPREHENSIVE HEARING TEST: CPT | Performed by: AUDIOLOGIST

## 2025-05-12 PROCEDURE — 92504 EAR MICROSCOPY EXAMINATION: CPT | Performed by: OTOLARYNGOLOGY

## 2025-05-12 NOTE — PROGRESS NOTES
Lori Garcia is a 74 year old female. No chief complaint on file.    HPI:   74-year-old white female longstanding patient usually seen for wax although does get periodic vertigo.  Also has noted subjective decrease in hearing.  Current Medications[1]   Past Medical History[2]   Social History:  Short Social Hx on File[3]   Past Surgical History[4]      REVIEW OF SYSTEMS:   GENERAL HEALTH: feels well otherwise  GENERAL : denies fever, chills, sweats, weight loss, weight gain  SKIN: denies any unusual skin lesions or rashes  RESPIRATORY: denies shortness of breath with exertion  NEURO: denies headaches    EXAM:   There were no vitals taken for this visit.    System Pertinent findings Details   Constitutional  Overall appearance - Normal.   Head/Face  Facial features -- Normal. Skull - Normal.   Eyes  Pupils equal ,round ,react to light and accomidate   Ears  External Ear Right: Normal, Left: Normal. Canal - Right: Normal, Left: Normal. TM - Right: Wax removed TM normal left: Wax removed TM normal   Nose  External Nose, Normal, Septum -midline,Nasal Vault, clear. Turbinates - Right: Normal left: Normal   Mouth/Throat  Lips/teeth/gums - Normal. Tonsils -1+ oropharynx - Normal.   Neck Exam  Inspection - Normal. Palpation - Normal. Parotid gland - Normal. Thyroid gland -normal   Lymph Detail  Submental. Submandibular. Anterior cervical. Posterior cervical. Supraclavicular.   Patients exam showed wax impaction right ear, wax impaction left ear. Ear wax was removed under the operative microscope. No complications. Patient tolerated the procedure well. Ear exam findings listed in note after removal.  Audiogram normal    ASSESSMENT AND PLAN:   1. Bilateral impacted cerumen  Follow-up as needed    2. Subjective hearing loss  Follow-up audiogram as needed      The patient indicates understanding of these issues and agrees to the plan.      Alistair Brewster MD  5/12/2025  2:57 PM       [1]   Current Outpatient Medications    Medication Sig Dispense Refill    Losartan Potassium-HCTZ 100-12.5 MG Oral Tab Take 0.5 tablets by mouth daily. 45 tablet 0    LORazepam 0.5 MG Oral Tab 1 to 2 tabs 1 hour before procedure. May repeat 1 tab every 20 minutes to a max 2 mg before procedure as needed for anxiety related to procedure. 10 tablet 0    rosuvastatin 5 MG Oral Tab Take 1 tablet (5 mg total) by mouth nightly. (Patient taking differently: Take 1 tablet (5 mg total) by mouth nightly. Takes twice weekly) 90 tablet 3    Zinc 30 MG Oral Cap Take by mouth.      Calcium Carb-Cholecalciferol (CALCIUM 1000 + D OR) Take 600 mg by mouth 3 (three) times daily as needed.      acetaminophen 500 MG Oral Tab Take 2 tablets (1,000 mg total) by mouth one time. Takes 1,000mg in the morning      Multiple Vitamin (TAB-A-GENA) Oral Tab Take 1 tablet by mouth daily.      Cholecalciferol (VITAMIN D OR) Take 1 capsule by mouth daily. 2000 mg daily  30 tablet 0   [2]   Past Medical History:   Arthritis    bilateral hips    Colon polyp    High blood pressure    High cholesterol    HYPERTENSION    Osteoarthritis    hips    OSTEOPENIA    DEXA Lspine Tscore-1.6, Hip Tscore-1.1, fem neck Tscore -2.3    Other and unspecified hyperlipidemia    Pain in joints    Visual impairment    contact lenses and glasses    Wears glasses   [3]   Social History  Socioeconomic History    Marital status:    Tobacco Use    Smoking status: Never    Smokeless tobacco: Never   Vaping Use    Vaping status: Never Used   Substance and Sexual Activity    Alcohol use: Yes     Alcohol/week: 3.0 standard drinks of alcohol     Types: 3 Glasses of wine per week    Drug use: No    Sexual activity: Yes   Other Topics Concern    Caffeine Concern Yes    Exercise Yes    Seat Belt Yes     Social Drivers of Health      Received from [facility]    Housing Stability   [4]   Past Surgical History:  Procedure Laterality Date    Back surgery  2/5/15    C3-C7 ACDF     Benign biopsy left       Benign biopsy right      Colonoscopy  2009    Dr Pedraza due 5 years    Colonoscopy      Cyst aspiration left      2003    Drain/inject large joint/bursa  1/13/2011    Performed by JONNY CADET at [facility]    Fluoroscopic guidance needle placement  1/13/2011    Performed by JONNY CADET at [facility]    Hip replacement surgery      Knee replacement surgery      right     Antwan localization wire 1 site left (cpt=19281)  1997    benign/another Bx done not sure when? also benign    Antwan localization wire 1 site right (cpt=19281)      1997 bn    Needle biopsy left  09/2009    externally done at [facility]    Other surgical history      Bilateral eyelids    Other surgical history      multiple breast biopsy    Spine surgery procedure unlisted      Total hip replacement Left 10/2015    Total hip replacement Right 12/03/2015    Upper gi endoscopy,exam  1/2013    colonoscopy

## 2025-05-13 ENCOUNTER — LAB ENCOUNTER (OUTPATIENT)
Dept: LAB | Age: 75
End: 2025-05-13
Attending: FAMILY MEDICINE
Payer: MEDICARE

## 2025-05-13 DIAGNOSIS — E78.00 PURE HYPERCHOLESTEROLEMIA: ICD-10-CM

## 2025-05-13 DIAGNOSIS — R23.3 PETECHIAL RASH: ICD-10-CM

## 2025-05-13 DIAGNOSIS — I10 ESSENTIAL HYPERTENSION: ICD-10-CM

## 2025-05-13 DIAGNOSIS — D50.0 IRON DEFICIENCY ANEMIA DUE TO CHRONIC BLOOD LOSS: ICD-10-CM

## 2025-05-13 DIAGNOSIS — R53.83 FATIGUE, UNSPECIFIED TYPE: ICD-10-CM

## 2025-05-13 DIAGNOSIS — R73.03 PREDIABETES: Chronic | ICD-10-CM

## 2025-05-13 DIAGNOSIS — M25.50 ARTHRALGIA, UNSPECIFIED JOINT: ICD-10-CM

## 2025-05-13 DIAGNOSIS — D69.2 AUTOERYTHROCYTE SENSITIVITY DISORDER: ICD-10-CM

## 2025-05-13 DIAGNOSIS — R23.9 UNSPECIFIED SKIN CHANGES: ICD-10-CM

## 2025-05-13 DIAGNOSIS — M81.0 AGE-RELATED OSTEOPOROSIS WITHOUT CURRENT PATHOLOGICAL FRACTURE: ICD-10-CM

## 2025-05-13 LAB
ALBUMIN SERPL-MCNC: 5 G/DL (ref 3.2–4.8)
ALBUMIN/GLOB SERPL: 2.3 {RATIO} (ref 1–2)
ALP LIVER SERPL-CCNC: 84 U/L (ref 55–142)
ALT SERPL-CCNC: 14 U/L (ref 10–49)
ANION GAP SERPL CALC-SCNC: 8 MMOL/L (ref 0–18)
AST SERPL-CCNC: 20 U/L (ref ?–34)
BASOPHILS # BLD AUTO: 0.04 X10(3) UL (ref 0–0.2)
BASOPHILS NFR BLD AUTO: 0.7 %
BILIRUB SERPL-MCNC: 0.9 MG/DL (ref 0.2–1.1)
BUN BLD-MCNC: 12 MG/DL (ref 9–23)
CALCIUM BLD-MCNC: 9.8 MG/DL (ref 8.7–10.6)
CHLORIDE SERPL-SCNC: 99 MMOL/L (ref 98–112)
CHOLEST SERPL-MCNC: 181 MG/DL (ref ?–200)
CO2 SERPL-SCNC: 26 MMOL/L (ref 21–32)
CREAT BLD-MCNC: 0.66 MG/DL (ref 0.55–1.02)
CRP SERPL-MCNC: <0.4 MG/DL (ref ?–0.5)
DEPRECATED HBV CORE AB SER IA-ACNC: 348 NG/ML (ref 50–306)
EGFRCR SERPLBLD CKD-EPI 2021: 91 ML/MIN/1.73M2 (ref 60–?)
EOSINOPHIL # BLD AUTO: 0.1 X10(3) UL (ref 0–0.7)
EOSINOPHIL NFR BLD AUTO: 1.7 %
ERYTHROCYTE [DISTWIDTH] IN BLOOD BY AUTOMATED COUNT: 11.9 %
ERYTHROCYTE [SEDIMENTATION RATE] IN BLOOD: 16 MM/HR (ref 0–30)
EST. AVERAGE GLUCOSE BLD GHB EST-MCNC: 108 MG/DL (ref 68–126)
FASTING PATIENT LIPID ANSWER: YES
FASTING STATUS PATIENT QL REPORTED: YES
GLOBULIN PLAS-MCNC: 2.2 G/DL (ref 2–3.5)
GLUCOSE BLD-MCNC: 91 MG/DL (ref 70–99)
HBA1C MFR BLD: 5.4 % (ref ?–5.7)
HCT VFR BLD AUTO: 39.5 % (ref 35–48)
HDLC SERPL-MCNC: 76 MG/DL (ref 40–59)
HGB BLD-MCNC: 13.5 G/DL (ref 12–16)
IMM GRANULOCYTES # BLD AUTO: 0.01 X10(3) UL (ref 0–1)
IMM GRANULOCYTES NFR BLD: 0.2 %
IRON SATN MFR SERPL: 32 % (ref 15–50)
IRON SERPL-MCNC: 101 UG/DL (ref 50–170)
LDLC SERPL CALC-MCNC: 87 MG/DL (ref ?–100)
LYMPHOCYTES # BLD AUTO: 1.69 X10(3) UL (ref 1–4)
LYMPHOCYTES NFR BLD AUTO: 28.9 %
MCH RBC QN AUTO: 30.3 PG (ref 26–34)
MCHC RBC AUTO-ENTMCNC: 34.2 G/DL (ref 31–37)
MCV RBC AUTO: 88.8 FL (ref 80–100)
MONOCYTES # BLD AUTO: 0.68 X10(3) UL (ref 0.1–1)
MONOCYTES NFR BLD AUTO: 11.6 %
NEUTROPHILS # BLD AUTO: 3.32 X10 (3) UL (ref 1.5–7.7)
NEUTROPHILS # BLD AUTO: 3.32 X10(3) UL (ref 1.5–7.7)
NEUTROPHILS NFR BLD AUTO: 56.9 %
NONHDLC SERPL-MCNC: 105 MG/DL (ref ?–130)
OSMOLALITY SERPL CALC.SUM OF ELEC: 275 MOSM/KG (ref 275–295)
PLATELET # BLD AUTO: 351 10(3)UL (ref 150–450)
POTASSIUM SERPL-SCNC: 4 MMOL/L (ref 3.5–5.1)
PROT SERPL-MCNC: 7.2 G/DL (ref 5.7–8.2)
RBC # BLD AUTO: 4.45 X10(6)UL (ref 3.8–5.3)
RHEUMATOID FACT SERPL-ACNC: <3.5 IU/ML (ref ?–14)
SODIUM SERPL-SCNC: 133 MMOL/L (ref 136–145)
TOTAL IRON BINDING CAPACITY: 314 UG/DL (ref 250–425)
TRANSFERRIN SERPL-MCNC: 260 MG/DL (ref 250–380)
TRIGL SERPL-MCNC: 102 MG/DL (ref 30–149)
URATE SERPL-MCNC: 4.1 MG/DL (ref 3.1–7.8)
VLDLC SERPL CALC-MCNC: 16 MG/DL (ref 0–30)
WBC # BLD AUTO: 5.8 X10(3) UL (ref 4–11)

## 2025-05-13 PROCEDURE — 86038 ANTINUCLEAR ANTIBODIES: CPT

## 2025-05-13 PROCEDURE — 36415 COLL VENOUS BLD VENIPUNCTURE: CPT

## 2025-05-13 PROCEDURE — 86431 RHEUMATOID FACTOR QUANT: CPT

## 2025-05-13 PROCEDURE — 86200 CCP ANTIBODY: CPT

## 2025-05-13 PROCEDURE — 86225 DNA ANTIBODY NATIVE: CPT

## 2025-05-13 PROCEDURE — 83036 HEMOGLOBIN GLYCOSYLATED A1C: CPT

## 2025-05-13 PROCEDURE — 84550 ASSAY OF BLOOD/URIC ACID: CPT

## 2025-05-13 PROCEDURE — 82728 ASSAY OF FERRITIN: CPT

## 2025-05-13 PROCEDURE — 83550 IRON BINDING TEST: CPT

## 2025-05-13 PROCEDURE — 83540 ASSAY OF IRON: CPT

## 2025-05-13 PROCEDURE — 80053 COMPREHEN METABOLIC PANEL: CPT

## 2025-05-13 PROCEDURE — 85025 COMPLETE CBC W/AUTO DIFF WBC: CPT

## 2025-05-13 PROCEDURE — 85652 RBC SED RATE AUTOMATED: CPT

## 2025-05-13 PROCEDURE — 80061 LIPID PANEL: CPT

## 2025-05-13 PROCEDURE — 86140 C-REACTIVE PROTEIN: CPT

## 2025-05-16 LAB
CCP IGG SERPL-ACNC: 0.6 U/ML (ref 0–6.9)
DSDNA IGG SERPL IA-ACNC: <0.6 IU/ML (ref ?–10)
ENA AB SER QL IA: <0.09 UG/L (ref ?–0.7)
ENA AB SER QL IA: NEGATIVE

## 2025-05-22 ENCOUNTER — OFFICE VISIT (OUTPATIENT)
Dept: FAMILY MEDICINE CLINIC | Facility: CLINIC | Age: 75
End: 2025-05-22
Payer: MEDICARE

## 2025-05-22 VITALS
HEART RATE: 94 BPM | OXYGEN SATURATION: 97 % | WEIGHT: 117.63 LBS | HEIGHT: 56 IN | DIASTOLIC BLOOD PRESSURE: 80 MMHG | RESPIRATION RATE: 16 BRPM | SYSTOLIC BLOOD PRESSURE: 132 MMHG | BODY MASS INDEX: 26.46 KG/M2

## 2025-05-22 DIAGNOSIS — R73.03 PREDIABETES: Chronic | ICD-10-CM

## 2025-05-22 DIAGNOSIS — M51.360 DEGENERATION OF INTERVERTEBRAL DISC OF LUMBAR REGION WITH DISCOGENIC BACK PAIN: ICD-10-CM

## 2025-05-22 DIAGNOSIS — I51.5 CARDIAC CALCIFICATION (HCC): ICD-10-CM

## 2025-05-22 DIAGNOSIS — E78.00 PURE HYPERCHOLESTEROLEMIA: ICD-10-CM

## 2025-05-22 DIAGNOSIS — I10 ESSENTIAL HYPERTENSION: Primary | ICD-10-CM

## 2025-05-22 PROBLEM — I82.562 CHRONIC DEEP VEIN THROMBOSIS (DVT) OF CALF MUSCLE VEIN OF LEFT LOWER EXTREMITY (HCC): Status: RESOLVED | Noted: 2024-07-31 | Resolved: 2025-05-22

## 2025-05-22 PROCEDURE — 99214 OFFICE O/P EST MOD 30 MIN: CPT | Performed by: FAMILY MEDICINE

## 2025-05-22 PROCEDURE — G2211 COMPLEX E/M VISIT ADD ON: HCPCS | Performed by: FAMILY MEDICINE

## 2025-05-22 RX ORDER — ACETAMINOPHEN AND CODEINE PHOSPHATE 300; 30 MG/1; MG/1
1 TABLET ORAL EVERY 4 HOURS PRN
Qty: 20 TABLET | Refills: 2 | Status: SHIPPED | OUTPATIENT
Start: 2025-05-22

## 2025-05-22 NOTE — ASSESSMENT & PLAN NOTE
Stable, continue present management and continue to monitor for progression seen 2022 scar of heaert. esitalopram in 2019 after several surgeries, completed by 2020    Stable

## 2025-05-22 NOTE — PROGRESS NOTES
The following individual(s) verbally consented to be recorded using ambient AI listening technology and understand that they can each withdraw their consent to this listening technology at any point by asking the clinician to turn off or pause the recording:    Patient name: Lori Garcia

## 2025-05-22 NOTE — ASSESSMENT & PLAN NOTE
Cholesterol shows Good control. Long term heart-healthy diet and lifestyle discussed and encouraged to reduce risk of cardiovascular disease.  5/13/2025: Cholesterol, Total 181; HDL Cholesterol 76 (H); Triglycerides 102; LDL Cholesterol 87  Cholesterol Meds: rosuvastatin Tabs - 5 MG  stable  Continue with current treatment plan        Today, I Johan Summers MD reviewed the Patient Provider Pain Agreement (PPPA) with Lori Garcia on 05/22/25. We discussed the goals of chronic pain management, risks of chronic opioid therapy, and conditions for continued treatment.    Pain Agreement

## 2025-05-22 NOTE — ASSESSMENT & PLAN NOTE
Orders:    Acetaminophen-Codeine; Take 1 tablet by mouth every 4 (four) hours as needed for Pain.  Dispense: 20 tablet; Refill: 2

## 2025-05-22 NOTE — PROGRESS NOTES
Subjective:   Lori is a 74 year old female coming in for had concerns including Follow - Up (6 month follow up).   HPI  History of Present Illness  Lori Garcia is a 74 year old female with osteoarthritis who presents with severe joint pain and concerns about rheumatoid arthritis.    She experiences significant joint pain, particularly during the cold winter months, describing it as a burning sensation affecting her arms, wrists, and legs, which makes movement difficult. The pain can last for two to three days before subsiding. She manages the pain with two Tylenols in the morning and two after dinner. She has tried other pain medications like diclofenac and Celebrex but experienced adverse effects such as leg stiffness.    She inquired about the possibility of having rheumatoid arthritis, as she had been tested many years ago with negative results. Recent tests, including C-reactive protein, sed rate, rheumatoid factor, and anti-CCP, were conducted, and the results were negative for rheumatoid arthritis. Her RONEY was also negative.    She mentions having low sodium levels, which she attributes to drinking a lot of water before her blood test. Her sodium level was slightly below normal at 135. She also noted concerns about her iron levels, but her ferritin and iron levels were reported as normal.    Her A1c has improved from 6.1% last fall to 5.4%, indicating better blood sugar control. Her bad cholesterol has decreased from 128 to 87, and her hemoglobin has returned to normal levels after a previous drop due to a blood clot.    She has a history of a blood clot in her leg last June, which has resulted in persistent ankle swelling. An ultrasound in December and a recent scan showed no new clots, and she was taken off Eliquis after three months.    She received steroid injections in her shoulders for arthritis and is scheduled for a steroid injection in her back for stenosis. She was previously prescribed Valium as a  muscle relaxant for procedures.    She takes a baby aspirin daily and has been prescribed Tylenol with codeine for severe pain episodes, which she plans to use sparingly.     Doing well but lots of pain lately    /80   Pulse 94   Resp 16   Ht 4' 8\" (1.422 m)   Wt 117 lb 9.6 oz (53.3 kg)   SpO2 97%   BMI 26.37 kg/m²  Body mass index is 26.37 kg/m².   Physical Exam  Vitals and nursing note reviewed.   Constitutional:       General: She is not in acute distress.     Appearance: Normal appearance. She is well-developed.   HENT:      Head: Normocephalic and atraumatic.   Cardiovascular:      Rate and Rhythm: Normal rate and regular rhythm.      Pulses:           Posterior tibial pulses are 2+ on the right side and 2+ on the left side.      Heart sounds: Normal heart sounds. No murmur heard.  Pulmonary:      Effort: Pulmonary effort is normal. No respiratory distress.      Breath sounds: Normal breath sounds. No wheezing.   Abdominal:      General: Bowel sounds are normal.      Palpations: Abdomen is soft.      Tenderness: There is no abdominal tenderness.   Musculoskeletal:         General: Normal range of motion.      Cervical back: Normal range of motion.      Right lower leg: No edema.      Left lower leg: No edema.   Skin:     General: Skin is warm and dry.      Findings: No rash.   Neurological:      Mental Status: She is alert and oriented to person, place, and time.   Psychiatric:         Mood and Affect: Mood normal.         Behavior: Behavior normal.         Thought Content: Thought content normal.         Judgment: Judgment normal.        Physical Exam          Results  LABS  C-reactive protein: undetectable  Sed rate: 16 mm/hr  Rheumatoid factor: negative  Anti-CCP: negative  RONEY: negative  Sodium: <135 mmol/L  Hemoglobin A1c: 5.4%  Ferritin: 348 ng/mL  Low-density lipoprotein: 87 mg/dL  Hemoglobin: 13.5 g/dL     Assessment & Plan  Essential hypertension  BP shows borderline control with last BP of  132/80. Work on lifestyle changes, diet, exercise and weight management.   5/13/2025: Potassium 4.0; Creatinine 0.66; eGFR-Cr 91  BP Meds: Losartan Potassium-HCTZ Tabs - 100-12.5 MG             Pure hypercholesterolemia  Cholesterol shows Good control. Long term heart-healthy diet and lifestyle discussed and encouraged to reduce risk of cardiovascular disease.  5/13/2025: Cholesterol, Total 181; HDL Cholesterol 76 (H); Triglycerides 102; LDL Cholesterol 87  Cholesterol Meds: rosuvastatin Tabs - 5 MG  stable  Continue with current treatment plan        Today, I Johan Summers MD reviewed the Patient Provider Pain Agreement (PPPA) with Lori Radha on 05/22/25. We discussed the goals of chronic pain management, risks of chronic opioid therapy, and conditions for continued treatment.    Pain Agreement    Prediabetes  5/13/2025: HgbA1C 5.4; Glucose 91 Sugar control is stable  Continue with current treatment plan  Pre-Diabetic. Not currently on Diabetic meds.          Cardiac calcification (HCC)  Stable, continue present management and continue to monitor for progression seen 2022 scar of indianat. esitalopram in 2019 after several surgeries, completed by 2020    Stable         Degeneration of intervertebral disc of lumbar region with discogenic back pain    Orders:    Acetaminophen-Codeine; Take 1 tablet by mouth every 4 (four) hours as needed for Pain.  Dispense: 20 tablet; Refill: 2     I, Johan Summers MD, understand the risks of co-prescribing an opioid and benzodiazepine as detailed in the FDA black box warning. I have alerted my patient to the risks and have prescribed the lowest necessary dose for the medically indicated treatment.   Using rarely for Pian shots.         Assessment & Plan  Osteoarthritis  Osteoarthritis confirmed as primary diagnosis. Cold weather exacerbates symptoms. Tylenol inadequate for severe flare-ups.  - Prescribed Tylenol with codeine for severe pain, 20 tablets with 6-month refills.  - Advised  heating pads for muscles, ice for joints.  - Discussed Aleve for severe pain, safer for blood pressure but harsher on stomach.  - Advised against daily NSAIDs due to baby aspirin use.  - Discussed opioid risks, established opioid agreement.    Chronic pain due to osteoarthritis  Chronic pain impacts daily function. Tylenol insufficient during flare-ups. NSAIDs limited due to side effects and concurrent medications.  - Prescribed Tylenol with codeine for severe pain, 20 tablets with 6-month refills.  - Advised heating pads for muscles, ice for joints.  - Discussed Aleve for severe pain, safer for blood pressure but harsher on stomach.  - Advised against daily NSAIDs due to baby aspirin use.  - Discussed opioid risks, established opioid agreement.    Hyponatremia  Hyponatremia likely from overhydration before blood draw. Sodium slightly below normal, not concerning.  - Advised reducing water intake before blood tests.    Blood clot in leg  Residual ankle swelling from previous clot, no new clots on ultrasound. Swelling due to valve damage.  - Monitor for new clot symptoms, perform ultrasound if symptoms arise.  - Reassured swelling not indicative of new clot.  I am having Lori Garcia maintain her Cholecalciferol (VITAMIN D OR), acetaminophen, Tab-A-Marissa, Calcium Carb-Cholecalciferol (CALCIUM 1000 + D OR), Zinc, rosuvastatin, LORazepam, and Losartan Potassium-HCTZ.       Return in about 6 months (around 11/22/2025) for AWV with chonic condition follow up.

## 2025-05-22 NOTE — ASSESSMENT & PLAN NOTE
BP shows borderline control with last BP of 132/80. Work on lifestyle changes, diet, exercise and weight management.   5/13/2025: Potassium 4.0; Creatinine 0.66; eGFR-Cr 91  BP Meds: Losartan Potassium-HCTZ Tabs - 100-12.5 MG

## 2025-05-22 NOTE — ASSESSMENT & PLAN NOTE
5/13/2025: HgbA1C 5.4; Glucose 91 Sugar control is stable  Continue with current treatment plan  Pre-Diabetic. Not currently on Diabetic meds.

## 2025-06-25 ENCOUNTER — HOSPITAL ENCOUNTER (OUTPATIENT)
Dept: ULTRASOUND IMAGING | Facility: HOSPITAL | Age: 75
Discharge: HOME | End: 2025-06-25
Attending: INTERNAL MEDICINE
Payer: MEDICARE

## 2025-06-25 ENCOUNTER — TELEPHONE (OUTPATIENT)
Age: 75
End: 2025-06-25

## 2025-06-25 DIAGNOSIS — I82.4Y2 ACUTE DEEP VEIN THROMBOSIS (DVT) OF PROXIMAL VEIN OF LEFT LOWER EXTREMITY (HCC): ICD-10-CM

## 2025-06-25 PROCEDURE — 93971 EXTREMITY STUDY: CPT | Performed by: INTERNAL MEDICINE

## 2025-06-25 NOTE — TELEPHONE ENCOUNTER
Ankle gets numb at the end of the day. Has some swelling off and on. Wants to make sure everything is still ok. US order placed. Pt will call to schedule.

## 2025-06-25 NOTE — TELEPHONE ENCOUNTER
Pt called she would like to know if  can place an order for her to received another Ultrasound on her left leg.      Pt is also requesting to speak with a nurse    Please give pt a call back

## 2025-07-01 ENCOUNTER — TELEPHONE (OUTPATIENT)
Dept: FAMILY MEDICINE CLINIC | Facility: CLINIC | Age: 75
End: 2025-07-01

## 2025-07-01 DIAGNOSIS — Z12.31 SCREENING MAMMOGRAM FOR BREAST CANCER: Primary | ICD-10-CM

## 2025-07-01 NOTE — TELEPHONE ENCOUNTER
Patient is requesting an order for her annual screening mammogram.    Patient has been notified to allow 2-3 business days for order placement. Reviewed with patient that she may schedule mammogram via [employer] or by calling Central Scheduling at that time.    Request for mammogram order routed to triage.

## 2025-07-01 NOTE — TELEPHONE ENCOUNTER
Per mammogram 7/31/24  ROUTINE MAMMOGRAM AND CLINICAL EVALUATION IN 12 MONTHS.       Mammogram order placed.

## 2025-07-29 DIAGNOSIS — I10 ESSENTIAL HYPERTENSION: ICD-10-CM

## 2025-07-31 RX ORDER — LOSARTAN POTASSIUM AND HYDROCHLOROTHIAZIDE 12.5; 1 MG/1; MG/1
0.5 TABLET ORAL DAILY
Qty: 45 TABLET | Refills: 3 | Status: SHIPPED | OUTPATIENT
Start: 2025-07-31

## 2025-08-04 ENCOUNTER — HOSPITAL ENCOUNTER (OUTPATIENT)
Dept: MAMMOGRAPHY | Age: 75
Discharge: HOME | End: 2025-08-04
Attending: FAMILY MEDICINE

## 2025-08-04 DIAGNOSIS — Z12.31 SCREENING MAMMOGRAM FOR BREAST CANCER: ICD-10-CM

## 2025-08-04 PROCEDURE — 77067 SCR MAMMO BI INCL CAD: CPT | Performed by: FAMILY MEDICINE

## 2025-08-04 PROCEDURE — 77063 BREAST TOMOSYNTHESIS BI: CPT | Performed by: FAMILY MEDICINE

## 2025-08-28 ENCOUNTER — OFFICE VISIT (OUTPATIENT)
Facility: LOCATION | Age: 75
End: 2025-08-28
Attending: INTERNAL MEDICINE

## 2025-08-28 DIAGNOSIS — Z86.718 HISTORY OF DEEP VEIN THROMBOSIS: Primary | ICD-10-CM

## (undated) DIAGNOSIS — Z13.6 SCREENING FOR CARDIOVASCULAR CONDITION: Primary | ICD-10-CM

## (undated) DIAGNOSIS — I10 ESSENTIAL HYPERTENSION: ICD-10-CM

## (undated) DIAGNOSIS — Z11.52 ENCOUNTER FOR SCREENING FOR COVID-19: Primary | ICD-10-CM

## (undated) DEVICE — BANDAGE ELASTIC ACE 6\" X-LONG

## (undated) DEVICE — 450 ML BOTTLE OF 0.05% CHLORHEXIDINE GLUCONATE IN 99.95% STERILE WATER FOR IRRIGATION, USP AND APPLICATOR.: Brand: IRRISEPT ANTIMICROBIAL WOUND LAVAGE

## (undated) DEVICE — DRAPE,U/SHT,SPLIT,FILM,60X84,STERILE: Brand: MEDLINE

## (undated) DEVICE — HOOD, PEEL-AWAY: Brand: FLYTE

## (undated) DEVICE — DRESSING AQUACEL AG 3.5X12

## (undated) DEVICE — Device: Brand: STABLECUT®

## (undated) DEVICE — SPECIMEN CONTAINER,POSITIVE SEAL INDICATOR, OR PACKAGED: Brand: PRECISION

## (undated) DEVICE — CONVERTORS STOCKINETTE: Brand: CONVERTORS

## (undated) DEVICE — BIPOLAR SEALER 23-112-1 AQM 6.0: Brand: AQUAMANTYS™

## (undated) DEVICE — TOTAL KNEE CDS: Brand: MEDLINE INDUSTRIES, INC.

## (undated) DEVICE — KENDALL SCD EXPRESS SLEEVES, KNEE LENGTH, MEDIUM: Brand: KENDALL SCD

## (undated) DEVICE — SUTURE VICRYL 2-0 CP-1

## (undated) DEVICE — BOWL CEMENT MIX QUICK-VAC

## (undated) DEVICE — STERILE POLYISOPRENE POWDER-FREE SURGICAL GLOVES: Brand: PROTEXIS

## (undated) DEVICE — GOWN SURG AERO CHROME XXL

## (undated) DEVICE — SOL  .9 1000ML BTL

## (undated) DEVICE — UNIVERSAL STERIBUMP® STERILE (5/CASE): Brand: UNIVERSAL STERIBUMP®

## (undated) DEVICE — SIGMA LCS HIGH PERFORMANCE STERILE THREADED HEADED PINS: Brand: SIGMA LCS HIGH PERFORMANCE

## (undated) DEVICE — 2T11 #2 PDO 36 X 36: Brand: 2T11 #2 PDO 36 X 36

## (undated) DEVICE — SUTURE VICRYL 1 OS-6

## (undated) DEVICE — WRAP COOLING KNEE W/ICE PILLOW

## (undated) DEVICE — ZIMMER® STERILE DISPOSABLE TOURNIQUET CUFF WITH PLC, DUAL PORT, SINGLE BLADDER, 34 IN. (86 CM)

## (undated) DEVICE — CHLORAPREP 26ML APPLICATOR

## (undated) DEVICE — DECANTER BAG 9": Brand: MEDLINE INDUSTRIES, INC.

## (undated) DEVICE — SIGMA LCS HIGH PERFORMANCE INSTRUMENTS STERILE THREADED PINS: Brand: SIGMA LCS HIGH PERFORMANCE

## (undated) NOTE — IP AVS SNAPSHOT
Patient Demographics     Address  [address] [address] Phone  [phone] [facility])  [phone] (Mobile) *Preferred* E-mail Address  [email or web address]      Emergency Contact(s)     Name Relation Home Work Mobile    Kameron Garcia Spouse office visit. Return to work  ? Usually allowed after four to six weeks. Discuss specific work activities with your surgeon. Restrictions  ? For knee replacement surgery, follow instructions provided by physical therapy.   ? Do NOT put a pillow ? Do not take aspirin while taking blood thinners unless ordered by your physician. ? Review anticoagulant education information sheet provided. Discomfort  ? Surgical discomfort is normal for one to two months.   ? Have realistic goals and keep a posit laxatives such as Miralax or Milk of Magnesia if needed. ? An enema or suppository may be needed if above measures do not work. Prevention of infection and promotion of healing  ? Good hand washing is important.  Everyone should wash their hands or use ? Increased or foul smelling drainage from incision  ? Red streaks on skin near incision. ? Temperature >100.4F.  ? Increased pain at incision not relieved by pain medication. Signs of blood clot  ?  Pain, excessive tenderness, redness, or swell [facility]  P:[phone]  H:[phone]            SPECIAL  INSTRUCTIONS:              View knee discharge education video at [email or web address]. Choose after surgery info.                         Your medication list      BRANDO Saloni [facility] [address]    Phone:  [phone]   ferrous sulfate 325 (65 FE) MG Tbec     Please  your prescriptions at the location directed by your doctor or nurse    Bring a paper prescription for each of these medications  aspirin 325 MG Tbec  docus Ordering provider:  Ekaterina Sheppard DO  10/25/19 0535 Resulting lab:  [facility]    Specimen Information    Type Source Collected On   Blood — 10/25/19 0308          Components    Component Value Reference Range Flag Lab   Glucose 125 70 - 99 mg/dL [facility] - Abbreviation Name Director Address Valid Date Range    JUNIOR/ Alfonso Navarro [address] [id number] 02/03/16 1201 - Present            Microbiology Results (All)     None         H&P - H&P Note Performed by Barrera Dudley MD at [address] [place] [address]     • HIP TOTAL REPLACEMENT Right 12/3/2015    Performed by Kevin Marte MD at [facility] MAIN OR   • HIP TOTAL REPLACEMENT Left 10/7/2015    Performed by Xin Costello General: Alert, orientated x3. Cooperative. No apparent distress. Vital Signs:  Height 4' 10\" (1.473 m), weight 130 lb (59 kg), not currently breastfeeding. HEENT: Exam is unremarkable. Neck: No tenderness to palpitation. No JVD. Supple.    Lungs: C 10/23/2019  5:45 PM     Consult Orders    1.  Consult to Hospitalist [[id number]] ordered by Alysa Arzate at 10/23/19 1051              [facility] hospitalist initial consult note  Casi Null MD  Consulted at the request of Dr. Ramos Quiroga  Reason for con • LUMBAR EPIDURAL N/A 3/23/2017    Performed by Byron Olmos MD at [address] N/A 3/9/2017    Performed by Byron Olmos MD at [address]   • LEILA LOCALIZATION WIRE 1 SITE LEFT (CPT=19281) Stress: Not on file    Relationships      Social connections:        Talks on phone: Not on file        Gets together: Not on file        Attends [employer] service: Not on file        Active member of club or organization: Not on file        Attends me Cholecalciferol (VITAMIN D OR), Take 1 capsule by mouth daily.  2000 mg daily , Disp: 30 tablet, Rfl: 0  amoxicillin 500 MG Oral Cap, TAKE FOUR CAPSULES BY MOUTH ONE HOUR BEFORE APPOINTMENT AS NEEDED, Disp: 4 capsule, Rfl: 3  [DISCONTINUED] LOSARTAN KAI 12:37 PM  Version 1 of 1    Author:  Summer Wells PTA Service:  Rehab Author Type:  Physical Therapy Assistant    Filed:  10/25/2019 12:37 PM Date of Service:  10/25/2019 12:32 PM Status:  Signed    :  Summer Wells PTA (Physical Thera • LUMBAR EPIDURAL N/A 3/23/2017    Performed by Byron Olmos MD at [address] N/A 3/9/2017    Performed by Byron Olmos MD at [address]   • LEILA LOCALIZATION WIRE 1 SITE LEFT (CPT=19281) CMS Modifier (G-Code): CJ    FUNCTIONAL ABILITY STATUS  Gait Assessment   Gait Assistance: Contact guard assist;Supervision  Distance (ft): 300  Assistive Device: Rolling walker  Pattern: R Decreased stance time  Stoop/Curb Assistance: Contact guard assist and this patient is demonstrating a 29% degree of impairment in mobility. Research supports that patients with this level of impairment may benefit from home c HHPT.        DISCHARGE RECOMMENDATIONS  PT Discharge Recommendations: Home with home health PT • High blood pressure    • High cholesterol    • HYPERTENSION    • Osteoarthritis     hips   • OSTEOPENIA 10/23/08    DEXA Lspine Tscore-1.6, Hip Tscore-1.1, fem neck Tscore -2.3   • Other and unspecified hyperlipidemia    • Pain in joints    • Visual impa Patient’s self-stated goal is to walk pain free     OBJECTIVE  Precautions: None    WEIGHT BEARING STATUS  Weight Bearing Restriction: R lower extremity        R Lower Extremity: Weight Bearing as Tolerated       PAIN ASSESSMENT   Ratin  Location: R kn Significance of achieving good ROM in a timely fashion explained.[CY.2]     PM : Pt participated in seated and standing therex per TKA protocol c increased reps.  Pt participated in stair training, stoop training and car/tub t/f training c cues for sequenci PT Discharge Recommendations: Home with home health PT    PLAN  PT Treatment Plan: Bed mobility; Endurance; Energy conservation;Patient education;Gait training;Neuromuscular re-educate;Range of motion;Strengthening;Stoop training;Stair training;Transfer vickie Past Medical History:   Diagnosis Date   • Arthritis     bilateral hips   • Colon polyp    • High blood pressure    • High cholesterol    • HYPERTENSION    • Osteoarthritis     hips   • OSTEOPENIA 10/23/08    DEXA Lspine Tscore-1.6, Hip Tscore-1.1, fem nec HOME SITUATION  Type of Home: House   Home Layout: Two level; Able to live on main level  Stairs to Enter : 2  Railing: Yes  Stairs to Bedroom: 0       Lives With: Spouse  Drives: Yes  Patient Owned Equipment: Rolling walker;Cane  Patient Regularly Uses: No -   Moving from lying on back to sitting on the side of the bed?: A Little   How much help from another person does the patient currently need. ..   -   Moving to and from a bed to a chair (including a wheelchair)?: A Little   -   Need to walk in hospital r evaluation, the patient presents with the following impairments R knee pain, limited R knee ROM, R LE strength deficits, and high level balance impairments. Functional outcome measures completed include AMPAC.  Based on this evaluation, patient's clinical p Author:  Tami Guzman PT Service:  Rehab Author Type:  Physical Therapist    Filed:  10/23/2019  3:51 PM Date of Service:  10/23/2019  3:48 PM Status:  Signed    :   Tami Guzman PT (Physical Therapist)       Order received for PT eval. • Pain in joints    • Visual impairment     contact lenses and glasses   • Wears glasses        Past Surgical History  Past Surgical History:   Procedure Laterality Date   • ANTERIOR CERVICAL FUSION BG & INST 4 LEVEL N/A 2/5/2015    Performed by Hazel Jones WEIGHT BEARING RESTRICTION  Weight Bearing Restriction: R lower extremity        R Lower Extremity: Weight Bearing as Tolerated       PAIN ASSESSMENT  Rating: Unable to rate  Location: R knee   Management Techniques: Activity promotion;Repositioning; Other Patient End of Session: Up in chair;Needs met;Call light within reach;RN aware of session/findings;SCDs in place; Ice applied; Family present    ASSESSMENT   Patient is a 77year old female admitted on 10/23/2019 from home. Pt is now s/p R TKA on 10/23.  Comp Patient will transfer to toilet:  with supervision progressing[LD.1]           Attribution Meneses    LD. 1 - Nely Jamison on 10/25/2019 10:01 AM               Occupational Therapy Note signed by Nely Jamison at 10/24/2019  9:47 AM  Version 1 of 1    Auth • BENIGN BIOPSY RIGHT     • COLONOSCOPY  2009    Dr Iglesia Uribe due 5 years   • COLONOSCOPY     • CYST ASPIRATION LEFT      2003   • HIP INJECTION Left 1/13/2011    Performed by Mauro Wilde MD at [address] [place] [address]     • HI \"My  helps me with getting dressed so it's not like I'll be doing this at home alone.      Patient self-stated goal is to go home tomorrow      OBJECTIVE  Precautions: None  Fall Risk: High fall risk    WEIGHT BEARING RESTRICTION  Weight Bearing Res to a standard toilet. Pt performed all aspects of toileting including yris-care and clothing management with supervision assist. Pt performed functional mobility with RW and CG assist to chair with CG assist for stand>sit.  Pt performed lower body dressing Clinical Decision Making LOW - Analysis of occupational profile, problem-focused assessments, limited treatment options    Overall Complexity LOW     OT Discharge Recommendations: Home with home health PT/OT; Intermittent Supervision  OT Device Recommendati Depo-Medrol 40mg Inj 03/16/17     Depo-Medrol 40mg Inj 03/16/17     Depo-Medrol 40mg Inj 01/03/17     Depo-Medrol 40mg Inj 12/15/16     Depo-Medrol 40mg Inj 10/03/16     Depo-Medrol 40mg Inj 09/20/16     Depo-Medrol 40mg Inj 07/26/16     Depo-Medrol 40mg